# Patient Record
Sex: FEMALE | Race: WHITE | NOT HISPANIC OR LATINO | Employment: FULL TIME | URBAN - METROPOLITAN AREA
[De-identification: names, ages, dates, MRNs, and addresses within clinical notes are randomized per-mention and may not be internally consistent; named-entity substitution may affect disease eponyms.]

---

## 2017-02-15 ENCOUNTER — OFFICE VISIT (OUTPATIENT)
Dept: CARDIOLOGY | Facility: CLINIC | Age: 57
End: 2017-02-15

## 2017-02-15 VITALS
SYSTOLIC BLOOD PRESSURE: 116 MMHG | DIASTOLIC BLOOD PRESSURE: 66 MMHG | WEIGHT: 165 LBS | HEART RATE: 66 BPM | BODY MASS INDEX: 25.9 KG/M2 | HEIGHT: 67 IN

## 2017-02-15 DIAGNOSIS — Z82.49 FAMILY HISTORY OF PREMATURE CORONARY ARTERY DISEASE: Primary | ICD-10-CM

## 2017-02-15 DIAGNOSIS — E78.2 MIXED HYPERLIPIDEMIA: ICD-10-CM

## 2017-02-15 DIAGNOSIS — I87.2 CHRONIC VENOUS INSUFFICIENCY: ICD-10-CM

## 2017-02-15 PROCEDURE — 93000 ELECTROCARDIOGRAM COMPLETE: CPT | Performed by: INTERNAL MEDICINE

## 2017-02-15 PROCEDURE — 99213 OFFICE O/P EST LOW 20 MIN: CPT | Performed by: INTERNAL MEDICINE

## 2017-02-15 NOTE — PROGRESS NOTES
Date of Office Visit: 02/15/2017  Encounter Provider: Feliciano Cordova MD  Place of Service: [facility] CARDIOLOGY  Patient Name: Sarah Borges  :1960    Chief Complaint   Patient presents with   • Coronary Artery Disease   :     HPI: Sarah Borges is a 56 y.o. female who presents today to follow up.  She has a sister who has premature coronary artery disease and had a large anterior infarct. The patient has familial hyperlipidemia and I follow her for that. She had a CT calcium score in 2016 which was completely normal (score of zero).  She has not had any chest discomfort. She does have chronic venous insufficiency and has some lower extremity edema. She saw the vein care clinic who felt that it was not severe enough to require intervention unless she would prefer it for cosmetic reasons. She has decided to wait on that.  She is very active and is doing well.      Past Medical History   Diagnosis Date   • Agatston coronary artery calcium score less than 100      score was zero, 2016   • Cervical neuritis      C4-C5 of the left   • Chronic venous insufficiency    • Hyperlipidemia    • Lateral epicondylitis of right elbow    • Lumbar radiculopathy    • Neck pain        Past Surgical History   Procedure Laterality Date   • Hysterectomy     • Vein ligation and stripping         Social History     Social History   • Marital status:      Spouse name: N/A   • Number of children: N/A   • Years of education: N/A     Occupational History   • Not on file.     Social History Main Topics   • Smoking status: Former Smoker   • Smokeless tobacco: Not on file   • Alcohol use Yes      Comment: WEEKENDS    • Drug use: No   • Sexual activity: Defer     Other Topics Concern   • Not on file     Social History Narrative       Family History   Problem Relation Age of Onset   • Hypertension Mother    • Heart attack Mother      At an advanced age.   • Migraines Sister    • Heart attack Sister   "    At a young age.   • Cancer Maternal Grandmother      Colon       Review of Systems   All other systems reviewed and are negative.      No Known Allergies      Current Outpatient Prescriptions:   •  atorvastatin (LIPITOR) 20 MG tablet, Take 1 tablet by mouth Every Night., Disp: 30 tablet, Rfl: 6  •  ferrous sulfate 325 (65 FE) MG tablet, Take 1 tablet by mouth daily with breakfast., Disp: 60 tablet, Rfl: 0  •  gabapentin (NEURONTIN) 300 MG capsule, Take 300 mg by mouth 3 (Three) Times a Day., Disp: , Rfl:   •  ibuprofen (ADVIL,MOTRIN) 200 MG tablet, Take 200 mg by mouth Every 6 (Six) Hours As Needed for mild pain (1-3)., Disp: , Rfl:   •  phentermine 37.5 MG capsule, Take 37.5 mg by mouth Every Morning. PT REPORTS TAKING ONE HALF TABLET DAILY, Disp: , Rfl:   •  progesterone (PROMETRIUM) 100 MG capsule, Daily., Disp: , Rfl: 5  •  vitamin B-12 (CYANOCOBALAMIN) 1000 MCG tablet, Take 1,000 mcg by mouth Daily., Disp: , Rfl:      Objective:     Vitals:    02/15/17 1516   BP: 116/66   Pulse: 66   Weight: 165 lb (74.8 kg)   Height: 67\" (170.2 cm)     Body mass index is 25.84 kg/(m^2).    Physical Exam   Constitutional: She is oriented to person, place, and time. She appears well-developed and well-nourished.   HENT:   Head: Normocephalic.   Nose: Nose normal.   Mouth/Throat: Oropharynx is clear and moist.   Eyes: Conjunctivae and EOM are normal. Pupils are equal, round, and reactive to light.   Neck: Normal range of motion. No JVD present.   Cardiovascular: Normal rate, regular rhythm, normal heart sounds and intact distal pulses.    No murmur heard.  Pulmonary/Chest: Effort normal and breath sounds normal.   Abdominal: Soft. She exhibits no mass. There is no tenderness.   Musculoskeletal: Normal range of motion. She exhibits no edema.   Multiple varicosities, no edema   Lymphadenopathy:     She has no cervical adenopathy.   Neurological: She is alert and oriented to person, place, and time. No cranial nerve deficit. "   Skin: Skin is warm and dry. No rash noted.   Psychiatric: She has a normal mood and affect. Her behavior is normal. Judgment and thought content normal.   Vitals reviewed.        ECG 12 Lead  Date/Time: 2/15/2017 4:54 PM  Performed by: FELICIANO CORDOVA  Authorized by: FELICIANO CORDOVA   Comparison: compared with previous ECG   Similar to previous ECG  Rhythm: sinus rhythm  Conduction: conduction normal  ST Segments: ST segments normal  T Waves: T waves normal  Other: no other findings  Clinical impression: normal ECG              Assessment:       Diagnosis Plan   1. Family history of premature coronary artery disease     2. Mixed hyperlipidemia     3. Chronic venous insufficiency            Plan:       She has a strong family history of premature CAD and she has hyperlipidemia, but her CT calcium score in 2016 was zero.  This will need to be repeated in 5-10 years.  She will remain on atorvastatin; she doesn't tolerate higher doses.    She has prominent varicosities but no significant edema.      Sincerely,       Feliciano Cordova MD

## 2017-05-10 ENCOUNTER — APPOINTMENT (OUTPATIENT)
Dept: CARDIOLOGY | Facility: HOSPITAL | Age: 57
End: 2017-05-10

## 2017-05-10 ENCOUNTER — OFFICE VISIT (OUTPATIENT)
Dept: SPORTS MEDICINE | Facility: CLINIC | Age: 57
End: 2017-05-10

## 2017-05-10 VITALS
OXYGEN SATURATION: 98 % | WEIGHT: 159 LBS | BODY MASS INDEX: 24.96 KG/M2 | HEART RATE: 83 BPM | SYSTOLIC BLOOD PRESSURE: 122 MMHG | DIASTOLIC BLOOD PRESSURE: 84 MMHG | TEMPERATURE: 98.7 F | HEIGHT: 67 IN

## 2017-05-10 DIAGNOSIS — M79.605 PAIN OF LEFT LOWER EXTREMITY: Primary | ICD-10-CM

## 2017-05-10 PROCEDURE — 99214 OFFICE O/P EST MOD 30 MIN: CPT | Performed by: FAMILY MEDICINE

## 2017-05-11 ENCOUNTER — HOSPITAL ENCOUNTER (OUTPATIENT)
Dept: CARDIOLOGY | Facility: HOSPITAL | Age: 57
Discharge: HOME | End: 2017-05-11
Admitting: FAMILY MEDICINE

## 2017-05-11 DIAGNOSIS — M79.605 PAIN OF LEFT LOWER EXTREMITY: ICD-10-CM

## 2017-05-11 LAB
BH CV LOW VAS LEFT SAPHENOFEMORAL JUNCTION SPONT: 1
BH CV LOWER VASCULAR LEFT COMMON FEMORAL AUGMENT: NORMAL
BH CV LOWER VASCULAR LEFT COMMON FEMORAL COMPETENT: NORMAL
BH CV LOWER VASCULAR LEFT COMMON FEMORAL COMPRESS: NORMAL
BH CV LOWER VASCULAR LEFT COMMON FEMORAL PHASIC: NORMAL
BH CV LOWER VASCULAR LEFT COMMON FEMORAL SPONT: NORMAL
BH CV LOWER VASCULAR LEFT DISTAL FEMORAL COMPRESS: NORMAL
BH CV LOWER VASCULAR LEFT GASTRONEMIUS COMPRESS: NORMAL
BH CV LOWER VASCULAR LEFT GREATER SAPH AK COMPRESS: NORMAL
BH CV LOWER VASCULAR LEFT GREATER SAPH BK COMPRESS: NORMAL
BH CV LOWER VASCULAR LEFT MID FEMORAL AUGMENT: NORMAL
BH CV LOWER VASCULAR LEFT MID FEMORAL COMPETENT: NORMAL
BH CV LOWER VASCULAR LEFT MID FEMORAL COMPRESS: NORMAL
BH CV LOWER VASCULAR LEFT MID FEMORAL PHASIC: NORMAL
BH CV LOWER VASCULAR LEFT MID FEMORAL SPONT: NORMAL
BH CV LOWER VASCULAR LEFT PERONEAL COMPRESS: NORMAL
BH CV LOWER VASCULAR LEFT POPLITEAL AUGMENT: NORMAL
BH CV LOWER VASCULAR LEFT POPLITEAL COMPETENT: NORMAL
BH CV LOWER VASCULAR LEFT POPLITEAL COMPRESS: NORMAL
BH CV LOWER VASCULAR LEFT POPLITEAL PHASIC: NORMAL
BH CV LOWER VASCULAR LEFT POPLITEAL SPONT: NORMAL
BH CV LOWER VASCULAR LEFT POSTERIOR TIBIAL COMPRESS: NORMAL
BH CV LOWER VASCULAR LEFT PROXIMAL FEMORAL COMPRESS: NORMAL
BH CV LOWER VASCULAR LEFT SAPHENOFEMORAL JUNCTION AUGMENT: NORMAL
BH CV LOWER VASCULAR LEFT SAPHENOFEMORAL JUNCTION COMPETENT: NORMAL
BH CV LOWER VASCULAR LEFT SAPHENOFEMORAL JUNCTION COMPRESS: NORMAL
BH CV LOWER VASCULAR LEFT SAPHENOFEMORAL JUNCTION PHASIC: NORMAL
BH CV LOWER VASCULAR LEFT SAPHENOFEMORAL JUNCTION SPONT: NORMAL
BH CV LOWER VASCULAR RIGHT COMMON FEMORAL AUGMENT: NORMAL
BH CV LOWER VASCULAR RIGHT COMMON FEMORAL COMPETENT: NORMAL
BH CV LOWER VASCULAR RIGHT COMMON FEMORAL COMPRESS: NORMAL
BH CV LOWER VASCULAR RIGHT COMMON FEMORAL PHASIC: NORMAL
BH CV LOWER VASCULAR RIGHT COMMON FEMORAL SPONT: NORMAL

## 2017-05-11 PROCEDURE — 93971 EXTREMITY STUDY: CPT

## 2017-05-23 ENCOUNTER — OFFICE VISIT (OUTPATIENT)
Dept: OBSTETRICS AND GYNECOLOGY | Facility: CLINIC | Age: 57
End: 2017-05-23

## 2017-05-23 VITALS
BODY MASS INDEX: 25.11 KG/M2 | WEIGHT: 160 LBS | HEIGHT: 67 IN | DIASTOLIC BLOOD PRESSURE: 70 MMHG | SYSTOLIC BLOOD PRESSURE: 138 MMHG

## 2017-05-23 DIAGNOSIS — Z01.419 WELL WOMAN EXAM WITH ROUTINE GYNECOLOGICAL EXAM: Primary | ICD-10-CM

## 2017-05-23 PROCEDURE — 99396 PREV VISIT EST AGE 40-64: CPT | Performed by: OBSTETRICS & GYNECOLOGY

## 2017-07-27 RX ORDER — ATORVASTATIN CALCIUM 20 MG/1
TABLET, FILM COATED ORAL
Qty: 30 TABLET | Refills: 0 | Status: SHIPPED | OUTPATIENT
Start: 2017-07-27 | End: 2017-09-13 | Stop reason: SDUPTHER

## 2017-09-13 DIAGNOSIS — E78.2 MIXED HYPERLIPIDEMIA: Primary | ICD-10-CM

## 2017-09-13 RX ORDER — ATORVASTATIN CALCIUM 20 MG/1
TABLET, FILM COATED ORAL
Qty: 30 TABLET | Refills: 2 | Status: SHIPPED | OUTPATIENT
Start: 2017-09-13 | End: 2018-01-15 | Stop reason: SDUPTHER

## 2017-09-13 NOTE — TELEPHONE ENCOUNTER
Pt is due for chol labs, called to discuss with pt, but she was unavailable.  Will contact pt tomorrow to see if she has had any labs done outside [facility].

## 2017-09-15 NOTE — TELEPHONE ENCOUNTER
Discussed with pt she denies any recent chol labs.  Please see pending lab orders.  Pt's lov 02/15/17 to return in 2 yrs.  Pt's last lipids on 08/09/2016.

## 2017-10-12 ENCOUNTER — LAB (OUTPATIENT)
Dept: LAB | Facility: HOSPITAL | Age: 57
End: 2017-10-12

## 2017-10-12 DIAGNOSIS — E78.2 MIXED HYPERLIPIDEMIA: ICD-10-CM

## 2017-10-12 LAB
CHOLEST SERPL-MCNC: 229 MG/DL (ref 0–200)
HDLC SERPL-MCNC: 73 MG/DL (ref 40–60)
LDLC SERPL CALC-MCNC: 142 MG/DL (ref 0–100)
LDLC/HDLC SERPL: 1.95 {RATIO}
TRIGL SERPL-MCNC: 69 MG/DL (ref 0–150)
VLDLC SERPL-MCNC: 13.8 MG/DL (ref 5–40)

## 2017-10-12 PROCEDURE — 36415 COLL VENOUS BLD VENIPUNCTURE: CPT

## 2017-10-12 PROCEDURE — 80061 LIPID PANEL: CPT

## 2018-01-16 RX ORDER — ATORVASTATIN CALCIUM 20 MG/1
TABLET, FILM COATED ORAL
Qty: 30 TABLET | Refills: 4 | Status: SHIPPED | OUTPATIENT
Start: 2018-01-16 | End: 2018-11-04 | Stop reason: SDUPTHER

## 2018-03-01 ENCOUNTER — OFFICE VISIT (OUTPATIENT)
Dept: SPORTS MEDICINE | Facility: CLINIC | Age: 58
End: 2018-03-01

## 2018-03-01 VITALS
WEIGHT: 155 LBS | OXYGEN SATURATION: 98 % | SYSTOLIC BLOOD PRESSURE: 114 MMHG | BODY MASS INDEX: 24.33 KG/M2 | HEIGHT: 67 IN | HEART RATE: 75 BPM | TEMPERATURE: 98.3 F | DIASTOLIC BLOOD PRESSURE: 68 MMHG

## 2018-03-01 DIAGNOSIS — J10.1 INFLUENZA B: ICD-10-CM

## 2018-03-01 DIAGNOSIS — R68.89 FLU-LIKE SYMPTOMS: Primary | ICD-10-CM

## 2018-03-01 PROCEDURE — 87804 INFLUENZA ASSAY W/OPTIC: CPT | Performed by: FAMILY MEDICINE

## 2018-03-01 PROCEDURE — 99213 OFFICE O/P EST LOW 20 MIN: CPT | Performed by: FAMILY MEDICINE

## 2018-03-01 RX ORDER — DEXTROMETHORPHAN HYDROBROMIDE AND PROMETHAZINE HYDROCHLORIDE 15; 6.25 MG/5ML; MG/5ML
SYRUP ORAL
Qty: 180 ML | Refills: 0 | Status: SHIPPED | OUTPATIENT
Start: 2018-03-01 | End: 2019-06-12

## 2018-03-01 RX ORDER — AMOXICILLIN AND CLAVULANATE POTASSIUM 250; 125 MG/1; MG/1
1 TABLET, FILM COATED ORAL 3 TIMES DAILY
COMMUNITY
End: 2018-03-01

## 2018-03-01 RX ORDER — OSELTAMIVIR PHOSPHATE 75 MG/1
75 CAPSULE ORAL 2 TIMES DAILY
Qty: 10 CAPSULE | Refills: 0 | Status: SHIPPED | OUTPATIENT
Start: 2018-03-01 | End: 2019-06-12

## 2018-03-01 NOTE — PROGRESS NOTES
"Sarah is a 57 y.o. year old female    Chief Complaint   Patient presents with   • Sinusitis     Pt would like to discuss        History of Present Illness   HPI   Sickness started Tuesday, worsening with fever 100.7 this morning.   +diarrhea  Malaise, myalgias  HA  Generalized, moderately severe    Saw [facility] yesterday, given augmentin for \"sinusitis\"    I have reviewed the patient's medical, family, and social history in detail and updated the computerized patient record.    Review of Systems   Constitutional: Positive for chills, fatigue and fever.   Respiratory: Negative for shortness of breath.    Neurological: Positive for headaches.       /68  Pulse 75  Temp 98.3 °F (36.8 °C)  Ht 170.2 cm (67\")  Wt 70.3 kg (155 lb)  SpO2 98%  BMI 24.28 kg/m2     Physical Exam   Constitutional: She appears well-developed and well-nourished.   HENT:   Mouth/Throat: Oropharynx is clear and moist.   Eyes: Pupils are equal, round, and reactive to light.   Cardiovascular: Normal heart sounds.    Pulmonary/Chest: Effort normal and breath sounds normal.   Lymphadenopathy:     She has cervical adenopathy.   Psychiatric: She has a normal mood and affect.   Vitals reviewed.    Flu B positive.     Diagnoses and all orders for this visit:    Flu-like symptoms  -     POC Influenza A / B    Influenza B  -     oseltamivir (TAMIFLU) 75 MG capsule; Take 1 capsule by mouth 2 (Two) Times a Day.  -     promethazine-dextromethorphan (PROMETHAZINE-DM) 6.25-15 MG/5ML syrup; Take 5-10mL po q6 hours prn cough    Other orders  -     Discontinue: amoxicillin-clavulanate (AUGMENTIN) 250-125 MG per tablet; Take 1 tablet by mouth 3 (Three) Times a Day.    Note she is right at 48 hour carlos, but only 24 with fever. Discussed borderline indication for tamiflu at this point. Discussed symptomatic measures and contagion.  D/c augmentin.        EMR Dragon/Transcription disclaimer:    Much of this encounter note is an electronic " transcription/translation of spoken language to printed text.  The electronic translation of spoken language may permit erroneous, or at times, nonsensical words or phrases to be inadvertently transcribed.  Although I have reviewed the note for such errors some may still exist.

## 2018-03-07 LAB
EXPIRATION DATE: ABNORMAL
FLUAV AG NPH QL: ABNORMAL
FLUBV AG NPH QL: ABNORMAL
INTERNAL CONTROL: ABNORMAL
Lab: ABNORMAL

## 2018-07-31 ENCOUNTER — TELEPHONE (OUTPATIENT)
Dept: SPORTS MEDICINE | Facility: CLINIC | Age: 58
End: 2018-07-31

## 2018-08-01 DIAGNOSIS — M54.16 LUMBAR RADICULITIS: Primary | ICD-10-CM

## 2018-11-05 RX ORDER — ATORVASTATIN CALCIUM 20 MG/1
TABLET, FILM COATED ORAL
Qty: 30 TABLET | Refills: 2 | Status: SHIPPED | OUTPATIENT
Start: 2018-11-05 | End: 2019-02-10 | Stop reason: SDUPTHER

## 2019-02-14 RX ORDER — ATORVASTATIN CALCIUM 20 MG/1
TABLET, FILM COATED ORAL
Qty: 30 TABLET | Refills: 0 | Status: SHIPPED | OUTPATIENT
Start: 2019-02-14 | End: 2019-03-18 | Stop reason: SDUPTHER

## 2019-03-18 DIAGNOSIS — E78.2 MIXED HYPERLIPIDEMIA: Primary | ICD-10-CM

## 2019-03-18 RX ORDER — ATORVASTATIN CALCIUM 20 MG/1
TABLET, FILM COATED ORAL
Qty: 30 TABLET | Refills: 1 | Status: SHIPPED | OUTPATIENT
Start: 2019-03-18 | End: 2019-06-12 | Stop reason: SDUPTHER

## 2019-03-18 NOTE — TELEPHONE ENCOUNTER
Refilled pt's Atorvastatin 20 mg for mixed hyperlipidemia.  Pt has an upcoming appt with Magali on 05/02/19.  Pt's last lipid was 10/12/2017.  Please see pending order.      Note: I checked for pt's pcp labs in [email or web address] as well.

## 2019-03-19 NOTE — TELEPHONE ENCOUNTER
Called and informed pt of recommended labs. She verbalized understanding and will have them completed.

## 2019-03-28 ENCOUNTER — TELEPHONE (OUTPATIENT)
Dept: CARDIOLOGY | Facility: CLINIC | Age: 59
End: 2019-03-28

## 2019-04-02 ENCOUNTER — LAB (OUTPATIENT)
Dept: LAB | Facility: HOSPITAL | Age: 59
End: 2019-04-02

## 2019-04-02 DIAGNOSIS — E78.2 MIXED HYPERLIPIDEMIA: ICD-10-CM

## 2019-04-02 LAB
CHOLEST SERPL-MCNC: 259 MG/DL (ref 0–200)
HDLC SERPL-MCNC: 67 MG/DL (ref 40–60)
LDLC SERPL CALC-MCNC: 172 MG/DL (ref 0–100)
LDLC/HDLC SERPL: 2.57 {RATIO}
TRIGL SERPL-MCNC: 98 MG/DL (ref 0–150)
VLDLC SERPL-MCNC: 19.6 MG/DL (ref 5–40)

## 2019-04-02 PROCEDURE — 80061 LIPID PANEL: CPT

## 2019-04-02 PROCEDURE — 36415 COLL VENOUS BLD VENIPUNCTURE: CPT

## 2019-04-11 ENCOUNTER — APPOINTMENT (OUTPATIENT)
Dept: WOMENS IMAGING | Facility: HOSPITAL | Age: 59
End: 2019-04-11

## 2019-04-11 ENCOUNTER — PROCEDURE VISIT (OUTPATIENT)
Dept: OBSTETRICS AND GYNECOLOGY | Facility: CLINIC | Age: 59
End: 2019-04-11

## 2019-04-11 ENCOUNTER — OFFICE VISIT (OUTPATIENT)
Dept: OBSTETRICS AND GYNECOLOGY | Facility: CLINIC | Age: 59
End: 2019-04-11

## 2019-04-11 VITALS
HEIGHT: 67 IN | BODY MASS INDEX: 26.46 KG/M2 | SYSTOLIC BLOOD PRESSURE: 124 MMHG | DIASTOLIC BLOOD PRESSURE: 68 MMHG | WEIGHT: 168.6 LBS

## 2019-04-11 DIAGNOSIS — M85.80 OSTEOPENIA, UNSPECIFIED LOCATION: ICD-10-CM

## 2019-04-11 DIAGNOSIS — Z12.31 VISIT FOR SCREENING MAMMOGRAM: Primary | ICD-10-CM

## 2019-04-11 DIAGNOSIS — Z12.39 SCREENING FOR BREAST CANCER: ICD-10-CM

## 2019-04-11 DIAGNOSIS — Z01.419 ENCOUNTER FOR GYNECOLOGICAL EXAMINATION WITHOUT ABNORMAL FINDING: Primary | ICD-10-CM

## 2019-04-11 DIAGNOSIS — Z78.0 MENOPAUSE: ICD-10-CM

## 2019-04-11 LAB
DEVELOPER EXPIRATION DATE: NORMAL
DEVELOPER LOT NUMBER: NORMAL
EXPIRATION DATE: NORMAL
FECAL OCCULT BLOOD SCREEN, POC: NEGATIVE
Lab: NORMAL
NEGATIVE CONTROL: NEGATIVE
POSITIVE CONTROL: POSITIVE

## 2019-04-11 PROCEDURE — 77067 SCR MAMMO BI INCL CAD: CPT | Performed by: RADIOLOGY

## 2019-04-11 PROCEDURE — G0328 FECAL BLOOD SCRN IMMUNOASSAY: HCPCS | Performed by: OBSTETRICS & GYNECOLOGY

## 2019-04-11 PROCEDURE — 99396 PREV VISIT EST AGE 40-64: CPT | Performed by: OBSTETRICS & GYNECOLOGY

## 2019-04-11 PROCEDURE — 77067 SCR MAMMO BI INCL CAD: CPT | Performed by: OBSTETRICS & GYNECOLOGY

## 2019-04-11 NOTE — PROGRESS NOTES
Subjective    Chief Complaint   Patient presents with   • Gynecologic Exam     ae, hyst w/o bso, no problems, former Dr. doe pt, would like to discuss hormones      History of Present Illness    Sarah Borges is a 58 y.o. female who presents for annual exam.  Non-smoker.  Mammogram today.  Does want a DEXA scan scheduled as has had osteopenia in the past.  Colonoscopy 5 years ago due to a family history of colon cancer will have one soon.  Not having many hot flashes.  Previous hysterectomy without BSO.  Does take progesterone 100 mg a day for weight loss by weight loss physician.    Obstetric History:  OB History     No data available         Menstrual History:     No LMP recorded.       Past Medical History:   Diagnosis Date   • Agatston coronary artery calcium score less than 100     score was zero, 2/2016   • Cervical neuritis     C4-C5 of the left   • Chronic venous insufficiency    • Hyperlipidemia    • Lateral epicondylitis of right elbow    • Lumbar radiculopathy    • Neck pain      Family History   Problem Relation Age of Onset   • Hypertension Mother    • Heart attack Mother         At an advanced age.   • Migraines Sister    • Heart attack Sister         At a young age.   • Cancer Maternal Grandmother         Colon     Social History     Tobacco Use   Smoking Status Former Smoker   Smokeless Tobacco Never Used     Counseling given: Not Answered      The following portions of the patient's history were reviewed and updated as appropriate: allergies, current medications, past family history, past medical history, past social history, past surgical history and problem list.    Review of Systems   Constitutional: Negative.  Negative for fever and unexpected weight change.   HENT: Negative.    Respiratory: Negative for shortness of breath and wheezing.    Cardiovascular: Negative for chest pain, palpitations and leg swelling.   Gastrointestinal: Negative for abdominal pain, anal bleeding and blood in  "stool.   Genitourinary: Negative for dysuria, pelvic pain, urgency, vaginal bleeding, vaginal discharge and vaginal pain.   Skin: Negative.    Neurological: Negative.    Hematological: Negative.  Negative for adenopathy.   Psychiatric/Behavioral: Negative.  Negative for dysphoric mood. The patient is not nervous/anxious.             Objective   Physical Exam   Constitutional: She is oriented to person, place, and time. She appears well-developed and well-nourished.   HENT:   Head: Normocephalic.   Eyes: Pupils are equal, round, and reactive to light.   Neck: No tracheal deviation present. No thyromegaly present.   Cardiovascular: Normal rate, regular rhythm and normal heart sounds.   No murmur heard.  Pulmonary/Chest: Effort normal and breath sounds normal. No respiratory distress.   Breasts without masses, tenderness or nipple discharge   Abdominal: Soft. She exhibits no mass. There is no hepatosplenomegaly. There is no tenderness. No hernia.   Genitourinary: Rectum normal and vagina normal. Rectal exam shows guaiac negative stool. No vaginal discharge found.   Genitourinary Comments: External genitalia normal   Lymphadenopathy:     She has no cervical adenopathy.     She has no axillary adenopathy.        Right: No inguinal adenopathy present.        Left: No inguinal adenopathy present.   Neurological: She is alert and oriented to person, place, and time.   Skin: Skin is warm and dry. No rash noted.   Psychiatric: She has a normal mood and affect. Her behavior is normal.   Vitals reviewed.      /68   Ht 170.2 cm (67\")   Wt 76.5 kg (168 lb 9.6 oz)   BMI 26.41 kg/m²     Assessment/Plan   Sarah was seen today for gynecologic exam.    Diagnoses and all orders for this visit:    Encounter for gynecological examination without abnormal finding  -     IGP,rfx Aptima HPV All Pth  -     POC Occult Blood Stool    Menopause    Screening for breast cancer    Osteopenia, unspecified location        Mammogram.  " Yusef  RTO 1 year     Counseled about continuing calcium with vitamin D.

## 2019-04-15 LAB
CONV .: NORMAL
CYTOLOGIST CVX/VAG CYTO: NORMAL
CYTOLOGY CVX/VAG DOC THIN PREP: NORMAL
DX ICD CODE: NORMAL
HIV 1 & 2 AB SER-IMP: NORMAL
OTHER STN SPEC: NORMAL
PATH REPORT.FINAL DX SPEC: NORMAL
STAT OF ADQ CVX/VAG CYTO-IMP: NORMAL

## 2019-05-23 ENCOUNTER — PROCEDURE VISIT (OUTPATIENT)
Dept: OBSTETRICS AND GYNECOLOGY | Facility: CLINIC | Age: 59
End: 2019-05-23

## 2019-05-23 DIAGNOSIS — Z78.0 MENOPAUSE: ICD-10-CM

## 2019-05-23 DIAGNOSIS — Z00.00 PREVENTATIVE HEALTH CARE: Primary | ICD-10-CM

## 2019-05-23 PROCEDURE — 77080 DXA BONE DENSITY AXIAL: CPT | Performed by: OBSTETRICS & GYNECOLOGY

## 2019-05-30 ENCOUNTER — TELEPHONE (OUTPATIENT)
Dept: OBSTETRICS AND GYNECOLOGY | Facility: CLINIC | Age: 59
End: 2019-05-30

## 2019-06-12 ENCOUNTER — OFFICE VISIT (OUTPATIENT)
Dept: CARDIOLOGY | Facility: CLINIC | Age: 59
End: 2019-06-12

## 2019-06-12 VITALS
WEIGHT: 169.6 LBS | DIASTOLIC BLOOD PRESSURE: 64 MMHG | BODY MASS INDEX: 26.62 KG/M2 | HEIGHT: 67 IN | SYSTOLIC BLOOD PRESSURE: 120 MMHG | HEART RATE: 70 BPM

## 2019-06-12 DIAGNOSIS — R60.0 LOWER EXTREMITY EDEMA: ICD-10-CM

## 2019-06-12 DIAGNOSIS — Z82.49 FAMILY HISTORY OF PREMATURE CORONARY ARTERY DISEASE: ICD-10-CM

## 2019-06-12 DIAGNOSIS — I87.2 CHRONIC VENOUS INSUFFICIENCY: ICD-10-CM

## 2019-06-12 DIAGNOSIS — E78.2 MIXED HYPERLIPIDEMIA: Primary | ICD-10-CM

## 2019-06-12 PROCEDURE — 99214 OFFICE O/P EST MOD 30 MIN: CPT | Performed by: NURSE PRACTITIONER

## 2019-06-12 PROCEDURE — 93000 ELECTROCARDIOGRAM COMPLETE: CPT | Performed by: NURSE PRACTITIONER

## 2019-06-12 RX ORDER — ATORVASTATIN CALCIUM 40 MG/1
40 TABLET, FILM COATED ORAL DAILY
Qty: 90 TABLET | Refills: 1 | Status: SHIPPED | OUTPATIENT
Start: 2019-06-12 | End: 2019-12-05 | Stop reason: SDUPTHER

## 2019-06-12 NOTE — PROGRESS NOTES
Date of Office Visit: 2019  Encounter Provider: ANA Walter  Place of Service: [facility] CARDIOLOGY  Patient Name: Sarah Borges  :1960      Chief Complaint   Patient presents with   • Family history of premature CAD   • Follow-up   :     Dear Dr. Araujo,     HPI: Sarah Borges is a pleasant 58 y.o. female who presents today for cardiac follow up. She is a new patient to me and her previous records have been reviewed.     She has a sister who had premature coronary artery disease and a large anterior infarct.  The patient's been diagnosed with familial hyperlipidemia and she has remained on statin therapy.  In 2016, she had a CT calcium score which was completely normal with a score of 0.  She has a history of chronic venous insufficiency and lower extremity edema.  She informs me that she has degenerative disc disease and underwent a neck fusion in 2019 and has been recommended to undergo a back fusion in 3 weeks.    She presents today for follow-up.  She was last seen in the office by Feliciano Cordova in 2017 and recommended to follow-up in 2 years.  She is not able to exercise due to her chronic back and neck pain.  She denies chest pain, shortness of air, PND, orthopnea, cough, palpitations, dizziness, or syncope.  She continues to have the chronic lower extremity edema  And feels that the varicosities in her lower extremities have worsened.  She does report some occasional raspy voice and shortness of breath with talking, but never exertion.  She does admit that she has gastric reflux.  Her blood pressure and heart rate are both normal today.  She has not had any recent blood work completed.    Past Medical History:   Diagnosis Date   • Agatston coronary artery calcium score less than 100     score was zero, 2016   • Cervical neuritis     C4-C5 of the left   • Chronic venous insufficiency    • Hyperlipidemia    • Lateral  epicondylitis of right elbow    • Lumbar radiculopathy    • Neck pain        Past Surgical History:   Procedure Laterality Date   • HYSTERECTOMY     • NECK SURGERY     • VEIN LIGATION AND STRIPPING         Social History     Socioeconomic History   • Marital status:      Spouse name: Not on file   • Number of children: Not on file   • Years of education: Not on file   • Highest education level: Not on file   Tobacco Use   • Smoking status: Former Smoker   • Smokeless tobacco: Never Used   Substance and Sexual Activity   • Alcohol use: Yes     Comment: Caffeine use   • Drug use: No   • Sexual activity: Defer       Family History   Problem Relation Age of Onset   • Hypertension Mother    • Heart attack Mother         At an advanced age.   • Migraines Sister    • Heart attack Sister         At a young age.   • Cancer Maternal Grandmother         Colon       The following portion of the patient's history were reviewed and updated as appropriate: past medical history, past surgical history, past social history, past family history, allergies, current medications, and problem list.    Review of Systems   Constitution: Negative for chills, diaphoresis, fever, malaise/fatigue, night sweats, weight gain and weight loss.   HENT: Negative for hearing loss, nosebleeds, sore throat and tinnitus.    Eyes: Negative for blurred vision, double vision, pain and visual disturbance.   Cardiovascular: Negative for chest pain, claudication, cyanosis, dyspnea on exertion, irregular heartbeat, leg swelling, near-syncope, orthopnea, palpitations, paroxysmal nocturnal dyspnea and syncope.   Respiratory: Negative for cough, hemoptysis, shortness of breath, snoring and wheezing.    Endocrine: Negative for cold intolerance, heat intolerance and polyuria.   Hematologic/Lymphatic: Negative for bleeding problem. Does not bruise/bleed easily.   Skin: Negative for color change, dry skin, flushing and itching.   Musculoskeletal: Negative for  "falls, joint pain, joint swelling, muscle cramps, muscle weakness and myalgias.   Gastrointestinal: Negative for abdominal pain, constipation, heartburn, melena, nausea and vomiting.   Genitourinary: Negative for dysuria and hematuria.   Neurological: Negative for excessive daytime sleepiness, dizziness, light-headedness, loss of balance, numbness, paresthesias, seizures and vertigo.   Psychiatric/Behavioral: Negative for altered mental status, depression, memory loss and substance abuse. The patient does not have insomnia and is not nervous/anxious.    Allergic/Immunologic: Negative for environmental allergies.       No Known Allergies      Current Outpatient Medications:   •  atorvastatin (LIPITOR) 40 MG tablet, Take 1 tablet by mouth Daily., Disp: 90 tablet, Rfl: 1  •  ferrous sulfate 325 (65 FE) MG tablet, Take 1 tablet by mouth daily with breakfast., Disp: 60 tablet, Rfl: 0  •  gabapentin (NEURONTIN) 300 MG capsule, Take 300 mg by mouth 3 (Three) Times a Day., Disp: , Rfl:   •  ibuprofen (ADVIL,MOTRIN) 200 MG tablet, Take 200 mg by mouth Every 6 (Six) Hours As Needed for mild pain (1-3)., Disp: , Rfl:   •  progesterone (PROMETRIUM) 200 MG capsule, TK ONE C PO QHS, Disp: , Rfl: 5  •  vitamin B-12 (CYANOCOBALAMIN) 1000 MCG tablet, Take 1,000 mcg by mouth Daily., Disp: , Rfl:         Objective:     Vitals:    06/12/19 1447   BP: 120/64   BP Location: Left arm   Pulse: 70   Weight: 76.9 kg (169 lb 9.6 oz)   Height: 170.2 cm (67\")     Body mass index is 26.56 kg/m².    PHYSICAL EXAM:    Vitals Reviewed.   General Appearance: No acute distress, well developed and well nourished.   Eyes: Conjunctiva and lids: No erythema, swelling, or discharge. Sclera non-icteric.   HENT: Atraumatic, normocephalic. External eyes, ears, and nose normal. No hearing loss noted. Mucous membranes normal. Lips not cyanotic. Neck supple with no tenderness.  Respiratory: No signs of respiratory distress. Respiration rhythm and depth normal. "   Clear to auscultation. No rales, crackles, rhonchi, or wheezing auscultated.   Cardiovascular:  Jugular Venous Pressure: Normal  Heart Rate and Rhythm: Normal, Heart Sounds: Normal S1 and S2. No S3 or S4 noted.  Murmurs: No murmurs noted. No rubs, thrills, or gallops.   Arterial Pulses: Carotid pulses normal. No carotid bruit noted.   Lower Extremities: No edema noted.  Gastrointestinal:  Abdomen soft, non-distended, non-tender. Normal bowel sounds. No hepatomegaly.   Musculoskeletal: Normal movement of extremities  Skin and Nails: General appearance normal. No pallor, cyanosis, diaphoresis. Skin temperature normal. No clubbing of fingernails.   Psychiatric: Patient alert and oriented to person, place, and time. Speech and behavior appropriate. Normal mood and affect.       ECG 12 Lead  Date/Time: 6/12/2019 2:50 PM  Performed by: Magali Mendez APRN  Authorized by: Magali Mendez APRN   Comparison: compared with previous ECG from 2/15/2017  Similar to previous ECG  Rhythm: sinus rhythm  Rate: normal  BPM: 70  Conduction: conduction normal  ST Segments: ST segments normal  T Waves: T waves normal  QRS axis: normal  Other: no other findings    Clinical impression: normal ECG              Assessment:       Diagnosis Plan   1. Mixed hyperlipidemia  Comprehensive Metabolic Panel    CBC Auto Differential    Lipid Panel   2. Family history of premature coronary artery disease     3. Chronic venous insufficiency     4. Lower extremity edema            Plan:       1.  Familial Hyperlipidemia: She remains on atorvastatin, and cannot tolerate higher doses.  I recommended a repeat lipid panel and have also added a CMP and CBC to be completed.    2.  Family History of Premature CAD: We discussed risk factor modification.  She had a CT calcium score of 0 in 2016 and Dr. Cordova has recommended that be repeated in 5-10 years.    3/4.  Chronic Venous insufficiency and Lower Extremity Edema: She has bilateral trace lower  extremity edema and varicosities noted.  I recommended elevation of her legs, compression stockings, and low-sodium diet.    4.  I will follow-up with her about her lab results.  I recommend to follow-up with Feliciano Cordova in 2 years.      As always, it has been a pleasure to participate in your patient's care. Thank you.       Sincerely,         ANA Pérez        **Dragon Disclaimer:**  Much of this encounter note is an electronic transcription/translation of spoken language to printed text. The electronic translation of spoken language may permit erroneous, or at times, nonsensical words or phrases to be inadvertently transcribed. Although I have reviewed the note for such errors, some may still exist.

## 2019-06-18 ENCOUNTER — TELEPHONE (OUTPATIENT)
Dept: CARDIOLOGY | Facility: CLINIC | Age: 59
End: 2019-06-18

## 2019-06-18 NOTE — TELEPHONE ENCOUNTER
Lipid Panel   Order: [id number]   Status:  Final result   Visible to patient:  Yes (Efrain) Dx:  Mixed hyperlipidemia    Ref Range & Units 2mo ago 1yr ago   Total Cholesterol 0 - 200 mg/dL 259 Abnormally high   229 Abnormally high     Triglycerides 0 - 150 mg/dL 98  69    HDL Cholesterol 40 - 60 mg/dL 67 Abnormally high   73 Abnormally high     LDL Cholesterol  0 - 100 mg/dL 172 Abnormally high   142 Abnormally high     VLDL Cholesterol 5 - 40 mg/dL 19.6  13.8    LDL/HDL Ratio  2.57  1.95            I spoke with her via telephone.  We went over these results last week in the office.  She is going to go to have a repeat lipid panel done in the near future.

## 2019-06-18 NOTE — TELEPHONE ENCOUNTER
----- Message from Feliciano Cordova MD sent at 4/2/2019  9:46 AM EDT -----  Can be discussed at visit with TK next month.    Just postpone this until she returns.    olivia norris

## 2019-09-05 ENCOUNTER — TELEPHONE (OUTPATIENT)
Dept: CARDIOLOGY | Facility: CLINIC | Age: 59
End: 2019-09-05

## 2019-09-05 NOTE — TELEPHONE ENCOUNTER
Pt called stating that she had spinal surgery on 8/14/19.  For surgery they couldn't find a vein so they placed a port in the right side of her neck. They think they pokes the heart with the end of the catheter which put her in Atrial fibrillation. She was in atrial fibrillation for 17 minutes with a heart rate of 140. They pulled back on the catheter and she went back into sinus rhythm. She states that since the procedure she has had 3-4 more episodes.    I have called [facility] and requested records    I have placed records in your in box for review

## 2019-09-05 NOTE — TELEPHONE ENCOUNTER
I s/w her and reviewed the records.  She had brief AF due to guidewire manipulation turing catheter placement.      She has had one-second episodes of taking a breath but no palpitations or sustained symptoms.  I don't think that is PAF.    Will go ahead and have her see TK in one month; please arrange.    olivia norris

## 2019-09-26 ENCOUNTER — HOSPITAL ENCOUNTER (OUTPATIENT)
Dept: GENERAL RADIOLOGY | Facility: HOSPITAL | Age: 59
Discharge: HOME | End: 2019-09-26
Admitting: NEUROLOGICAL SURGERY

## 2019-09-26 DIAGNOSIS — M41.9 SCOLIOSIS, UNSPECIFIED SCOLIOSIS TYPE, UNSPECIFIED SPINAL REGION: ICD-10-CM

## 2019-09-26 PROCEDURE — 72082 X-RAY EXAM ENTIRE SPI 2/3 VW: CPT

## 2019-09-30 ENCOUNTER — TRANSCRIBE ORDERS (OUTPATIENT)
Dept: PHYSICAL THERAPY | Facility: HOSPITAL | Age: 59
End: 2019-09-30

## 2019-09-30 DIAGNOSIS — M54.9 BACK PAIN, UNSPECIFIED BACK LOCATION, UNSPECIFIED BACK PAIN LATERALITY, UNSPECIFIED CHRONICITY: Primary | ICD-10-CM

## 2019-10-01 ENCOUNTER — HOSPITAL ENCOUNTER (OUTPATIENT)
Dept: PHYSICAL THERAPY | Facility: HOSPITAL | Age: 59
Setting detail: THERAPIES SERIES
Discharge: HOME | End: 2019-10-01

## 2019-10-01 ENCOUNTER — LAB (OUTPATIENT)
Dept: LAB | Facility: HOSPITAL | Age: 59
End: 2019-10-01

## 2019-10-01 DIAGNOSIS — G89.29 CHRONIC RIGHT-SIDED THORACIC BACK PAIN: ICD-10-CM

## 2019-10-01 DIAGNOSIS — Z51.89 AFTERCARE: ICD-10-CM

## 2019-10-01 DIAGNOSIS — M54.6 CHRONIC RIGHT-SIDED THORACIC BACK PAIN: ICD-10-CM

## 2019-10-01 DIAGNOSIS — E78.2 MIXED HYPERLIPIDEMIA: ICD-10-CM

## 2019-10-01 DIAGNOSIS — M54.50 CHRONIC RIGHT-SIDED LOW BACK PAIN WITHOUT SCIATICA: Primary | ICD-10-CM

## 2019-10-01 DIAGNOSIS — G89.29 CHRONIC RIGHT-SIDED LOW BACK PAIN WITHOUT SCIATICA: Primary | ICD-10-CM

## 2019-10-01 DIAGNOSIS — R26.89 DECREASED MOBILITY: ICD-10-CM

## 2019-10-01 LAB
ALBUMIN SERPL-MCNC: 4.6 G/DL (ref 3.5–5.2)
ALBUMIN/GLOB SERPL: 1.7 G/DL
ALP SERPL-CCNC: 136 U/L (ref 39–117)
ALT SERPL W P-5'-P-CCNC: 10 U/L (ref 1–33)
ANION GAP SERPL CALCULATED.3IONS-SCNC: 9.5 MMOL/L (ref 5–15)
AST SERPL-CCNC: 13 U/L (ref 1–32)
BASOPHILS # BLD AUTO: 0.03 10*3/MM3 (ref 0–0.2)
BASOPHILS NFR BLD AUTO: 0.4 % (ref 0–1.5)
BILIRUB SERPL-MCNC: 0.5 MG/DL (ref 0.2–1.2)
BUN BLD-MCNC: 15 MG/DL (ref 6–20)
BUN/CREAT SERPL: 25.4 (ref 7–25)
CALCIUM SPEC-SCNC: 9.8 MG/DL (ref 8.6–10.5)
CHLORIDE SERPL-SCNC: 100 MMOL/L (ref 98–107)
CHOLEST SERPL-MCNC: 246 MG/DL (ref 0–200)
CO2 SERPL-SCNC: 33.5 MMOL/L (ref 22–29)
CREAT BLD-MCNC: 0.59 MG/DL (ref 0.57–1)
DEPRECATED RDW RBC AUTO: 40 FL (ref 37–54)
EOSINOPHIL # BLD AUTO: 0.09 10*3/MM3 (ref 0–0.4)
EOSINOPHIL NFR BLD AUTO: 1.2 % (ref 0.3–6.2)
ERYTHROCYTE [DISTWIDTH] IN BLOOD BY AUTOMATED COUNT: 12.7 % (ref 12.3–15.4)
GFR SERPL CREATININE-BSD FRML MDRD: 104 ML/MIN/1.73
GLOBULIN UR ELPH-MCNC: 2.7 GM/DL
GLUCOSE BLD-MCNC: 98 MG/DL (ref 65–99)
HCT VFR BLD AUTO: 38 % (ref 34–46.6)
HDLC SERPL-MCNC: 58 MG/DL (ref 40–60)
HGB BLD-MCNC: 12 G/DL (ref 12–15.9)
IMM GRANULOCYTES # BLD AUTO: 0.03 10*3/MM3 (ref 0–0.05)
IMM GRANULOCYTES NFR BLD AUTO: 0.4 % (ref 0–0.5)
LDLC SERPL CALC-MCNC: 170 MG/DL (ref 0–100)
LDLC/HDLC SERPL: 2.94 {RATIO}
LYMPHOCYTES # BLD AUTO: 1.06 10*3/MM3 (ref 0.7–3.1)
LYMPHOCYTES NFR BLD AUTO: 14.7 % (ref 19.6–45.3)
MCH RBC QN AUTO: 27.3 PG (ref 26.6–33)
MCHC RBC AUTO-ENTMCNC: 31.6 G/DL (ref 31.5–35.7)
MCV RBC AUTO: 86.4 FL (ref 79–97)
MONOCYTES # BLD AUTO: 0.5 10*3/MM3 (ref 0.1–0.9)
MONOCYTES NFR BLD AUTO: 6.9 % (ref 5–12)
NEUTROPHILS # BLD AUTO: 5.52 10*3/MM3 (ref 1.7–7)
NEUTROPHILS NFR BLD AUTO: 76.4 % (ref 42.7–76)
NRBC BLD AUTO-RTO: 0 /100 WBC (ref 0–0.2)
PLATELET # BLD AUTO: 362 10*3/MM3 (ref 140–450)
PMV BLD AUTO: 9.5 FL (ref 6–12)
POTASSIUM BLD-SCNC: 4.4 MMOL/L (ref 3.5–5.2)
PROT SERPL-MCNC: 7.3 G/DL (ref 6–8.5)
RBC # BLD AUTO: 4.4 10*6/MM3 (ref 3.77–5.28)
SODIUM BLD-SCNC: 143 MMOL/L (ref 136–145)
TRIGL SERPL-MCNC: 88 MG/DL (ref 0–150)
VLDLC SERPL-MCNC: 17.6 MG/DL (ref 5–40)
WBC NRBC COR # BLD: 7.23 10*3/MM3 (ref 3.4–10.8)

## 2019-10-01 PROCEDURE — 36415 COLL VENOUS BLD VENIPUNCTURE: CPT

## 2019-10-01 PROCEDURE — 80061 LIPID PANEL: CPT

## 2019-10-01 PROCEDURE — 97110 THERAPEUTIC EXERCISES: CPT | Performed by: PHYSICAL THERAPIST

## 2019-10-01 PROCEDURE — 85025 COMPLETE CBC W/AUTO DIFF WBC: CPT

## 2019-10-01 PROCEDURE — 97161 PT EVAL LOW COMPLEX 20 MIN: CPT | Performed by: PHYSICAL THERAPIST

## 2019-10-01 PROCEDURE — 80053 COMPREHEN METABOLIC PANEL: CPT
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
## 2019-10-04 ENCOUNTER — HOSPITAL ENCOUNTER (OUTPATIENT)
Dept: PHYSICAL THERAPY | Facility: HOSPITAL | Age: 59
Setting detail: THERAPIES SERIES
Discharge: HOME | End: 2019-10-04

## 2019-10-04 DIAGNOSIS — M54.6 CHRONIC RIGHT-SIDED THORACIC BACK PAIN: ICD-10-CM

## 2019-10-04 DIAGNOSIS — Z51.89 AFTERCARE: ICD-10-CM

## 2019-10-04 DIAGNOSIS — G89.29 CHRONIC RIGHT-SIDED THORACIC BACK PAIN: ICD-10-CM

## 2019-10-04 DIAGNOSIS — R26.89 DECREASED MOBILITY: ICD-10-CM

## 2019-10-04 DIAGNOSIS — G89.29 CHRONIC RIGHT-SIDED LOW BACK PAIN WITHOUT SCIATICA: Primary | ICD-10-CM

## 2019-10-04 DIAGNOSIS — M54.50 CHRONIC RIGHT-SIDED LOW BACK PAIN WITHOUT SCIATICA: Primary | ICD-10-CM

## 2019-10-04 PROCEDURE — 97110 THERAPEUTIC EXERCISES: CPT

## 2019-10-04 NOTE — THERAPY TREATMENT NOTE
Outpatient Physical Therapy Ortho Treatment Note  [facility]     Patient Name: Sarah Borges  : 1960  MRN: [id number]  Today's Date: 10/4/2019      Visit Date: 10/04/2019    Visit Dx:    ICD-10-CM ICD-9-CM   1. Chronic right-sided low back pain without sciatica M54.5 724.2    G89.29 338.29   2. Chronic right-sided thoracic back pain M54.6 724.1    G89.29 338.29   3. Aftercare Z51.89 V58.9   4. Decreased mobility R26.89 781.99       Patient Active Problem List   Diagnosis   • Hyperlipidemia   • Chronic venous insufficiency   • Family history of premature coronary artery disease   • Lower extremity edema        Past Medical History:   Diagnosis Date   • Agatston coronary artery calcium score less than 100     score was zero, 2016   • Cervical neuritis     C4-C5 of the left   • Chronic venous insufficiency    • Hyperlipidemia    • Lateral epicondylitis of right elbow    • Lumbar radiculopathy    • Neck pain         Past Surgical History:   Procedure Laterality Date   • HYSTERECTOMY     • NECK SURGERY     • VEIN LIGATION AND STRIPPING                         PT Assessment/Plan     Row Name 10/04/19 1224          PT Assessment    Assessment Comments  Mrs. Borges returns with no new complaints and pain remaining at 3-4/10.  She performed all exericses with mininmal cuing, mostly needed for SPPT, but she was able to perform better after cuing.  No increase in pain with any exercises.  -LP     Please refer to paper survey for additional self-reported information  Yes  -LP     Rehab Potential  Excellent  -LP     Patient/caregiver participated in establishment of treatment plan and goals  Yes  -LP     Patient would benefit from skilled therapy intervention  Yes  -LP        PT Plan    PT Frequency  2x/week  -LP     Predicted Duration of Therapy Intervention (Therapy Eval)  4- 6 weeks  -LP     PT Plan Comments  re-assess her ability to perform SPPT correctly.  Progress as able.  -LP       User Key  (r) =  Recorded By, (t) = Taken By, (c) = Cosigned By    Initials Name Provider Type    LP Mindy Yuan, PT Physical Therapist            OP Exercises     Row Name 10/04/19 1200 10/04/19 0900          Subjective Pain    Pre-Treatment Pain Level  3  -LP  3  -LP        Total Minutes    86875 - PT Therapeutic Exercise Minutes  40  -LP  --        Exercise 1    Exercise Name 1  --  HS stretch with ankle floss  -LP     Cueing 1  --  Demo  -LP     Sets 1  --  1  -LP     Reps 1  --  10  -LP     Time 1  --  10 sec  -LP        Exercise 2    Exercise Name 2  --  SKTC each side  -LP     Sets 2  --  1  -LP     Reps 2  --  10  -LP     Time 2  --  10 sec  -LP        Exercise 4    Exercise Name 4  --  PPT  -LP     Cueing 4  --  Demo  -LP     Sets 4  --  1  -LP     Reps 4  --  10  -LP     Time 4  --  5 sec  -LP        Exercise 5    Exercise Name 5  --  LTR  -LP     Cueing 5  --  Demo  -LP     Reps 5  --  10  -LP     Time 5  --  2 sec  -LP        Exercise 6    Exercise Name 6  Standing lumbar extension  -LP  --     Cueing 6  Demo;Verbal  -LP  --     Sets 6  1  -LP  --     Reps 6  10  -LP  --     Additional Comments  standing at IIbars  -LP  --        Exercise 7    Exercise Name 7  HL hip add with ball, with PPT  -LP  --     Cueing 7  Verbal;Tactile;Demo  -LP  --     Sets 7  1  -LP  --     Reps 7  10  -LP  --        Exercise 8    Exercise Name 8  standing HR's  -LP  --     Sets 8  2  -LP  --     Reps 8  10  -LP  --        Exercise 9    Exercise Name 9  stand hip abd  -LP  --     Sets 9  1  -LP  --     Reps 9  10  -LP  --     Additional Comments  alternating  -LP  --        Exercise 10    Exercise Name 10  practiced pelvic tilt in stand  -LP  --     Sets 10  1  -LP  --     Reps 10  5  -LP  --     Additional Comments  within comfortable range  -LP  --        Exercise 11    Exercise Name 11  Nustep  -LP  --     Time 11  5 min  -LP  --     Additional Comments  L3 cuing for TA contraction in sitting  -LP  --       User Key  (r) = Recorded  By, (t) = Taken By, (c) = Cosigned By    Initials Name Provider Type    LP Mindy Yuan, PT Physical Therapist                       PT OP Goals     Row Name 10/04/19 1200          PT Short Term Goals    STG 1  Patient will be independent with initial HEP.  -LP     STG 1 Progress  Ongoing  -LP     STG 1 Progress Comments  required some cuing, especially with SPPT  -LP     STG 2  Patient will maintain proper body mechanics for transfers and light object lifting for spine preservation.  -LP     STG 2 Progress  Ongoing  -LP     STG 2 Progress Comments  supine- sit correctly with no cuing  -LP        Long Term Goals    LTG 1  Patient will be independent with progressive HEP for long term management of current condition.  -LP     LTG 1 Progress  Ongoing  -LP     LTG 2  25% improvement in lumbar AROM all planes to ease transfers.  -LP     LTG 2 Progress  Ongoing  -LP     LTG 3  Patient will score </=26% disability on the modified CARLENE to indicate improved perceived positional tolerance.  -LP     LTG 3 Progress  Ongoing  -LP     LTG 4  Patient will demonstrate sit<>stand without facial grimacing to ease community reintegration.  -LP     LTG 4 Progress  Ongoing  -LP     LTG 4 Progress Comments  difficulty with this today.  -LP       User Key  (r) = Recorded By, (t) = Taken By, (c) = Cosigned By    Initials Name Provider Type    LP Mindy Yuan PT Physical Therapist          Therapy Education  Education Details: core strengthening concepts  Given: HEP, Symptoms/condition management, Posture/body mechanics  Program: New, Reinforced  How Provided: Verbal, Demonstration, Written  Provided to: Patient  Level of Understanding: Teach back education performed              Time Calculation:   Start Time: 0915  Stop Time: 1000  Time Calculation (min): 45 min  Therapy Charges for Today     Code Description Service Date Service Provider Modifiers Qty    80455791262  PT THER PROC EA 15 MIN 10/4/2019 Mindy Yuan, PT GP  3                    Mindy Yuan, PT  10/4/2019

## 2019-10-08 ENCOUNTER — TELEPHONE (OUTPATIENT)
Dept: CARDIOLOGY | Facility: CLINIC | Age: 59
End: 2019-10-08

## 2019-10-08 ENCOUNTER — HOSPITAL ENCOUNTER (OUTPATIENT)
Dept: PHYSICAL THERAPY | Facility: HOSPITAL | Age: 59
Setting detail: THERAPIES SERIES
Discharge: HOME | End: 2019-10-08

## 2019-10-08 DIAGNOSIS — G89.29 CHRONIC RIGHT-SIDED LOW BACK PAIN WITHOUT SCIATICA: Primary | ICD-10-CM

## 2019-10-08 DIAGNOSIS — Z51.89 AFTERCARE: ICD-10-CM

## 2019-10-08 DIAGNOSIS — M54.6 CHRONIC RIGHT-SIDED THORACIC BACK PAIN: ICD-10-CM

## 2019-10-08 DIAGNOSIS — M54.50 CHRONIC RIGHT-SIDED LOW BACK PAIN WITHOUT SCIATICA: Primary | ICD-10-CM

## 2019-10-08 DIAGNOSIS — G89.29 CHRONIC RIGHT-SIDED THORACIC BACK PAIN: ICD-10-CM

## 2019-10-08 DIAGNOSIS — R26.89 DECREASED MOBILITY: ICD-10-CM

## 2019-10-08 PROCEDURE — 97110 THERAPEUTIC EXERCISES: CPT | Performed by: PHYSICAL THERAPIST

## 2019-10-08 NOTE — TELEPHONE ENCOUNTER
I spoke with Mrs. Borges via telephone about her lab results and elevated LDL and total cholesterol.  She has not been able to tolerate the higher doses of atorvastatin and has familial hyperlipidemia.  I think she really would benefit from Repatha or Praluent.  I gave her both of the names of the medications and she is going to do some research on her own.  She will be following up with me for scheduled appointment on Thursday and will further discuss at that time.

## 2019-10-08 NOTE — THERAPY TREATMENT NOTE
Outpatient Physical Therapy Ortho Treatment Note  [facility]     Patient Name: Sarah Borges  : 1960  MRN: [id number]  Today's Date: 10/8/2019      Visit Date: 10/08/2019    Visit Dx:    ICD-10-CM ICD-9-CM   1. Chronic right-sided low back pain without sciatica M54.5 724.2    G89.29 338.29   2. Chronic right-sided thoracic back pain M54.6 724.1    G89.29 338.29   3. Aftercare Z51.89 V58.9   4. Decreased mobility R26.89 781.99       Patient Active Problem List   Diagnosis   • Hyperlipidemia   • Chronic venous insufficiency   • Family history of premature coronary artery disease   • Lower extremity edema        Past Medical History:   Diagnosis Date   • Agatston coronary artery calcium score less than 100     score was zero, 2016   • Cervical neuritis     C4-C5 of the left   • Chronic venous insufficiency    • Hyperlipidemia    • Lateral epicondylitis of right elbow    • Lumbar radiculopathy    • Neck pain         Past Surgical History:   Procedure Laterality Date   • HYSTERECTOMY     • NECK SURGERY     • VEIN LIGATION AND STRIPPING                         PT Assessment/Plan     Row Name 10/08/19 1603          PT Assessment    Assessment Comments  Instructed to decreased intensity/frequency of HEP performance to calm down her pain; patient was receptive. Progressed to sustained low load long duration stretches in clinic.  -GR        PT Plan    PT Plan Comments  Continue at decreased intensity of HEP.  -GR       User Key  (r) = Recorded By, (t) = Taken By, (c) = Cosigned By    Initials Name Provider Type    GR Jean Paul Emery, PT Physical Therapist            OP Exercises     Row Name 10/08/19 1500             Subjective Comments    Subjective Comments  I've been pretty sore and it's not really getting much better. Walking 1 mile per day.  -GR         Subjective Pain    Able to rate subjective pain?  yes  -GR      Pre-Treatment Pain Level  4  -GR         Total Minutes    13569 - PT Therapeutic  Exercise Minutes  39  -GR         Exercise 1    Exercise Name 1  HS stretch with ankle floss  -GR      Cueing 1  Demo  -GR      Sets 1  1  -GR      Reps 1  3  -GR      Time 1  20 sec  -GR      Additional Comments  opposite  leg supported over swiss ball  -GR         Exercise 2    Exercise Name 2  SKTC each side  -GR      Sets 2  1  -GR      Reps 2  3  -GR      Time 2  20 seconds  -GR         Exercise 3    Exercise Name 3  DKTC with swiss ball  -GR      Cueing 3  Demo  -GR      Sets 3  1  -GR      Reps 3  15  -GR      Time 3  5 sec  -GR         Exercise 4    Exercise Name 4  PPT  -GR      Cueing 4  Demo  -GR      Sets 4  1  -GR      Reps 4  15  -GR      Time 4  5 sec  -GR         Exercise 5    Exercise Name 5  LTR  -GR      Cueing 5  Demo  -GR      Sets 5  2  -GR      Reps 5  10  -GR      Time 5  2 sec  -GR      Additional Comments  swiss ball  -GR         Exercise 6    Exercise Name 6  Standing lumbar extension  -GR      Cueing 6  Demo;Verbal  -GR      Sets 6  1  -GR      Reps 6  15  -GR      Additional Comments  hands on barre  -GR         Exercise 7    Exercise Name 7  HL hip add with ball, with PPT  -GR      Cueing 7  Verbal;Tactile;Demo  -GR      Sets 7  1  -GR      Reps 7  15  -GR      Time 7  5 sec  -GR      Additional Comments  +TA draw-in focus  -GR         Exercise 8    Exercise Name 8  standing HR's  -GR      Sets 8  2  -GR      Reps 8  10  -GR         Exercise 9    Exercise Name 9  HL clam with TA  -GR      Cueing 9  Demo  -GR      Sets 9  1  -GR      Reps 9  15  -GR      Additional Comments  RTB  -GR         Exercise 10    Exercise Name 10  standing hip abd  -GR      Cueing 10  Demo  -GR      Sets 10  1  -GR      Reps 10  10  -GR      Additional Comments  each side  -GR         Exercise 11    Exercise Name 11  Nustep  -GR      Time 11  5 min  -GR      Additional Comments  L4  -GR         Exercise 12    Exercise Name 12  Piriformis stretch  -GR      Cueing 12  Demo  -GR      Reps 12  3  -GR      Time 12   20 sec  -GR         Exercise 13    Exercise Name 13  Gastroc stretch step  -GR      Cueing 13  Demo  -GR      Sets 13  1  -GR      Reps 13  3  -GR      Time 13  20 sec  -GR        User Key  (r) = Recorded By, (t) = Taken By, (c) = Cosigned By    Initials Name Provider Type    Jean Paul Christensen, PT Physical Therapist                       PT OP Goals     Row Name 10/08/19 1600          PT Short Term Goals    STG 1  Patient will be independent with initial HEP.  -GR     STG 1 Progress  Ongoing  -GR     STG 2  Patient will maintain proper body mechanics for transfers and light object lifting for spine preservation.  -GR     STG 2 Progress  Ongoing  -GR        Long Term Goals    LTG 1  Patient will be independent with progressive HEP for long term management of current condition.  -GR     LTG 1 Progress  Ongoing  -GR     LTG 2  25% improvement in lumbar AROM all planes to ease transfers.  -GR     LTG 2 Progress  Ongoing  -GR     LTG 3  Patient will score </=26% disability on the modified CARLENE to indicate improved perceived positional tolerance.  -GR     LTG 3 Progress  Ongoing  -GR     LTG 4  Patient will demonstrate sit<>stand without facial grimacing to ease community reintegration.  -GR     LTG 4 Progress  Ongoing  -GR       User Key  (r) = Recorded By, (t) = Taken By, (c) = Cosigned By    Initials Name Provider Type    Jean Paul Christensen, PT Physical Therapist          Therapy Education  Education Details: HEP every other day              Time Calculation:   Start Time: 1450  Stop Time: 1530  Time Calculation (min): 40 min  Total Timed Code Minutes- PT: 39 minute(s)  Therapy Charges for Today     Code Description Service Date Service Provider Modifiers Qty    73283571799  PT THER PROC EA 15 MIN 10/8/2019 Jean Paul Emery, PT GP 3                    Jean Paul Emery PT  10/8/2019

## 2019-10-08 NOTE — TELEPHONE ENCOUNTER
Isabel-this patient may be a great candidate for Repatha or Praluent.  Are we able to start the preauthorization department to see what medication her insurance prefers?

## 2019-10-10 ENCOUNTER — HOSPITAL ENCOUNTER (OUTPATIENT)
Dept: PHYSICAL THERAPY | Facility: HOSPITAL | Age: 59
Setting detail: THERAPIES SERIES
Discharge: HOME | End: 2019-10-10

## 2019-10-10 DIAGNOSIS — M54.6 CHRONIC RIGHT-SIDED THORACIC BACK PAIN: ICD-10-CM

## 2019-10-10 DIAGNOSIS — Z51.89 AFTERCARE: ICD-10-CM

## 2019-10-10 DIAGNOSIS — R26.89 DECREASED MOBILITY: ICD-10-CM

## 2019-10-10 DIAGNOSIS — G89.29 CHRONIC RIGHT-SIDED LOW BACK PAIN WITHOUT SCIATICA: Primary | ICD-10-CM

## 2019-10-10 DIAGNOSIS — G89.29 CHRONIC RIGHT-SIDED THORACIC BACK PAIN: ICD-10-CM

## 2019-10-10 DIAGNOSIS — M54.50 CHRONIC RIGHT-SIDED LOW BACK PAIN WITHOUT SCIATICA: Primary | ICD-10-CM

## 2019-10-10 PROCEDURE — 97110 THERAPEUTIC EXERCISES: CPT | Performed by: PHYSICAL THERAPIST

## 2019-10-10 PROCEDURE — G0283 ELEC STIM OTHER THAN WOUND: HCPCS | Performed by: PHYSICAL THERAPIST

## 2019-10-10 NOTE — THERAPY TREATMENT NOTE
Outpatient Physical Therapy Ortho Treatment Note  [facility]     Patient Name: Sarah Borges  : 1960  MRN: [id number]  Today's Date: 10/10/2019      Visit Date: 10/10/2019    Visit Dx:    ICD-10-CM ICD-9-CM   1. Chronic right-sided low back pain without sciatica M54.5 724.2    G89.29 338.29   2. Chronic right-sided thoracic back pain M54.6 724.1    G89.29 338.29   3. Aftercare Z51.89 V58.9   4. Decreased mobility R26.89 781.99       Patient Active Problem List   Diagnosis   • Hyperlipidemia   • Chronic venous insufficiency   • Family history of premature coronary artery disease   • Lower extremity edema        Past Medical History:   Diagnosis Date   • Agatston coronary artery calcium score less than 100     score was zero, 2016   • Cervical neuritis     C4-C5 of the left   • Chronic venous insufficiency    • Hyperlipidemia    • Lateral epicondylitis of right elbow    • Lumbar radiculopathy    • Neck pain         Past Surgical History:   Procedure Laterality Date   • HYSTERECTOMY     • NECK SURGERY     • VEIN LIGATION AND STRIPPING                         PT Assessment/Plan     Row Name 10/10/19 1406          PT Assessment    Assessment Comments  Decreased intensity and held resistance training 2/2 flare-up of both spine and B hips. Trialed IFC which was returned with no immediate adverse response. Instructed to hold on therex over weekend and lay in 90/90 with ice as able.  -GR        PT Plan    PT Plan Comments  Return to therex as tolerated.  -GR       User Key  (r) = Recorded By, (t) = Taken By, (c) = Cosigned By    Initials Name Provider Type    GR Jean Paul Emery, PT Physical Therapist          Modalities     Row Name 10/10/19 1300             Ice    Ice Applied  Yes  -GR      Location  lumbar with IFC  -GR      Rx Minutes  15 mins  -GR         ELECTRICAL STIMULATION    Attended/Unattended  Unattended  -GR      Stimulation Type  IFC  -GR      Max mAmp  7  -GR      Location/Electrode  "Placement/Other  2 channels/4 electrodes/lumbar spine  -GR       PT E-Stim Unattended (Manual) Minutes  15  -GR        User Key  (r) = Recorded By, (t) = Taken By, (c) = Cosigned By    Initials Name Provider Type    GR Jean Paul Emery, PT Physical Therapist        OP Exercises     Row Name 10/10/19 1300             Subjective Comments    Subjective Comments  Back and hips are \"killing me.\" Had to roll every 40 minutes.  -GR         Subjective Pain    Able to rate subjective pain?  yes  -GR      Pre-Treatment Pain Level  5  -GR         Total Minutes    58547 - PT Therapeutic Exercise Minutes  20  -GR         Exercise 3    Exercise Name 3  DKTC with swiss ball  -GR      Cueing 3  Demo  -GR      Sets 3  1  -GR      Reps 3  15  -GR      Time 3  5 sec  -GR         Exercise 5    Exercise Name 5  LTR  -GR      Cueing 5  Demo  -GR      Sets 5  1  -GR      Reps 5  15  -GR      Time 5  2 sec  -GR      Additional Comments  swiss ball  -GR         Exercise 11    Exercise Name 11  Nustep  -GR      Time 11  5 min  -GR      Additional Comments  L4  -GR         Exercise 12    Exercise Name 12  Piriformis stretch  -GR      Cueing 12  Demo  -GR      Reps 12  3  -GR      Time 12  20 sec  -GR         Exercise 13    Exercise Name 13  Gastroc stretch step  -GR      Cueing 13  Demo  -GR      Sets 13  1  -GR      Reps 13  3  -GR      Time 13  20 sec  -GR      Additional Comments  knee supported- stretching soleous and avoiding hs strain  -GR        User Key  (r) = Recorded By, (t) = Taken By, (c) = Cosigned By    Initials Name Provider Type    Jean Paul Christensen, PT Physical Therapist                       PT OP Goals     Row Name 10/10/19 1400          PT Short Term Goals    STG 1  Patient will be independent with initial HEP.  -GR     STG 1 Progress  Ongoing  -GR     STG 1 Progress Comments  adjusting do to pain and still requiring cues  -GR     STG 2  Patient will maintain proper body mechanics for transfers and light object " lifting for spine preservation.  -GR     STG 2 Progress  Ongoing  -GR        Long Term Goals    LTG 1  Patient will be independent with progressive HEP for long term management of current condition.  -GR     LTG 1 Progress  Ongoing  -GR     LTG 2  25% improvement in lumbar AROM all planes to ease transfers.  -GR     LTG 2 Progress  Ongoing  -GR     LTG 3  Patient will score </=26% disability on the modified CARLENE to indicate improved perceived positional tolerance.  -GR     LTG 3 Progress  Ongoing  -GR     LTG 4  Patient will demonstrate sit<>stand without facial grimacing to ease community reintegration.  -GR     LTG 4 Progress  Ongoing  -GR       User Key  (r) = Recorded By, (t) = Taken By, (c) = Cosigned By    Initials Name Provider Type    GR Jean Paul Emery, PT Physical Therapist                         Time Calculation:   Start Time: 1330  Stop Time: 1420  Time Calculation (min): 50 min  Total Timed Code Minutes- PT: 20 minute(s)  Therapy Charges for Today     Code Description Service Date Service Provider Modifiers Qty    30456249260  PT THER PROC EA 15 MIN 10/10/2019 Jean Paul Emery, PT GP 1    [id number]  PT ELECTRICAL STIM UNATTENDED 10/10/2019 Jean Paul Emery, PT  1                    Jean Paul Emery PT  10/10/2019

## 2019-10-15 ENCOUNTER — HOSPITAL ENCOUNTER (OUTPATIENT)
Dept: PHYSICAL THERAPY | Facility: HOSPITAL | Age: 59
Setting detail: THERAPIES SERIES
Discharge: HOME | End: 2019-10-15

## 2019-10-15 DIAGNOSIS — Z51.89 AFTERCARE: ICD-10-CM

## 2019-10-15 DIAGNOSIS — G89.29 CHRONIC RIGHT-SIDED THORACIC BACK PAIN: ICD-10-CM

## 2019-10-15 DIAGNOSIS — M54.6 CHRONIC RIGHT-SIDED THORACIC BACK PAIN: ICD-10-CM

## 2019-10-15 DIAGNOSIS — G89.29 CHRONIC RIGHT-SIDED LOW BACK PAIN WITHOUT SCIATICA: Primary | ICD-10-CM

## 2019-10-15 DIAGNOSIS — M54.50 CHRONIC RIGHT-SIDED LOW BACK PAIN WITHOUT SCIATICA: Primary | ICD-10-CM

## 2019-10-15 DIAGNOSIS — R26.89 DECREASED MOBILITY: ICD-10-CM

## 2019-10-15 PROCEDURE — G0283 ELEC STIM OTHER THAN WOUND: HCPCS | Performed by: PHYSICAL THERAPIST

## 2019-10-15 PROCEDURE — 97110 THERAPEUTIC EXERCISES: CPT | Performed by: PHYSICAL THERAPIST

## 2019-10-15 NOTE — THERAPY TREATMENT NOTE
Outpatient Physical Therapy Ortho Treatment Note  [facility]     Patient Name: Sarah Borges  : 1960  MRN: [id number]  Today's Date: 10/15/2019      Visit Date: 10/15/2019    Visit Dx:    ICD-10-CM ICD-9-CM   1. Chronic right-sided low back pain without sciatica M54.5 724.2    G89.29 338.29   2. Chronic right-sided thoracic back pain M54.6 724.1    G89.29 338.29   3. Aftercare Z51.89 V58.9   4. Decreased mobility R26.89 781.99       Patient Active Problem List   Diagnosis   • Hyperlipidemia   • Chronic venous insufficiency   • Family history of premature coronary artery disease   • Lower extremity edema        Past Medical History:   Diagnosis Date   • Agatston coronary artery calcium score less than 100     score was zero, 2016   • Cervical neuritis     C4-C5 of the left   • Chronic venous insufficiency    • Hyperlipidemia    • Lateral epicondylitis of right elbow    • Lumbar radiculopathy    • Neck pain         Past Surgical History:   Procedure Laterality Date   • HYSTERECTOMY     • NECK SURGERY     • VEIN LIGATION AND STRIPPING                         PT Assessment/Plan     Row Name 10/15/19 1523          PT Assessment    Assessment Comments  Able to return to several therex today without pain. Modified stretching positions as appropriate and continued IFC for pain control. Encouraged every other day HEP compliance and to not overwork the back while has the opportunity to be off work.  -GR        PT Plan    PT Plan Comments  See assessment. Consider return to resistance next visit.  -GR       User Key  (r) = Recorded By, (t) = Taken By, (c) = Cosigned By    Initials Name Provider Type    GR Jean Paul Emery, PT Physical Therapist          Modalities     Row Name 10/15/19 1300             Ice    Location  lumbar with IFC  -GR      Rx Minutes  15 mins  -GR         ELECTRICAL STIMULATION    Attended/Unattended  Unattended  -GR      Stimulation Type  IFC  -GR      Max mAmp  7  -GR       Location/Electrode Placement/Other  2 channels/4 electrodes/lumbar spine  -GR       PT E-Stim Unattended (Manual) Minutes  15  -GR        User Key  (r) = Recorded By, (t) = Taken By, (c) = Cosigned By    Initials Name Provider Type    GR Jean Paul Emery, PT Physical Therapist        OP Exercises     Row Name 10/15/19 1300             Subjective Comments    Subjective Comments  Hips much better today, back still remains sore, thinks it may always be in some sort of pain.   -GR         Subjective Pain    Able to rate subjective pain?  yes  -GR      Pre-Treatment Pain Level  3  -GR         Total Minutes    55321 - PT Therapeutic Exercise Minutes  32  -GR         Exercise 1    Exercise Name 1  HS stretch stair  -GR      Cueing 1  Demo  -GR      Sets 1  1  -GR      Reps 1  3  -GR      Time 1  20 sec  -GR         Exercise 3    Exercise Name 3  DKTC with swiss ball  -GR      Cueing 3  Demo  -GR      Sets 3  1  -GR      Reps 3  15  -GR      Time 3  5 sec  -GR         Exercise 4    Exercise Name 4  PPT  -GR      Cueing 4  Demo  -GR      Sets 4  1  -GR      Reps 4  15  -GR      Time 4  5 sec  -GR         Exercise 5    Exercise Name 5  LTR  -GR      Cueing 5  Demo  -GR      Sets 5  1  -GR      Reps 5  15  -GR      Time 5  2 sec  -GR      Additional Comments  swiss ball  -GR         Exercise 7    Exercise Name 7  HL hip add with ball, with PPT  -GR      Cueing 7  Verbal;Tactile;Demo  -GR      Sets 7  1  -GR      Reps 7  15  -GR      Time 7  5 sec  -GR         Exercise 11    Exercise Name 11  Nustep  -GR      Time 11  5 min  -GR      Additional Comments  L5  -GR         Exercise 12    Exercise Name 12  Piriformis stretch  -GR      Cueing 12  Demo  -GR      Reps 12  3  -GR      Time 12  20 sec  -GR      Additional Comments  modified to sitting  -GR         Exercise 13    Exercise Name 13  Gastroc stretch step  -GR      Cueing 13  Demo  -GR      Sets 13  1  -GR      Reps 13  3  -GR      Time 13  20 sec  -GR      Additional  Comments  off step  -GR        User Key  (r) = Recorded By, (t) = Taken By, (c) = Cosigned By    Initials Name Provider Type    Jean Paul Christensen, PT Physical Therapist                       PT OP Goals     Row Name 10/15/19 1300          PT Short Term Goals    STG 1  Patient will be independent with initial HEP.  -GR     STG 1 Progress  Met  -GR     STG 2  Patient will maintain proper body mechanics for transfers and light object lifting for spine preservation.  -GR     STG 2 Progress  Ongoing  -GR        Long Term Goals    LTG 1  Patient will be independent with progressive HEP for long term management of current condition.  -GR     LTG 1 Progress  Ongoing  -GR     LTG 2  25% improvement in lumbar AROM all planes to ease transfers.  -GR     LTG 2 Progress  Ongoing  -GR     LTG 3  Patient will score </=26% disability on the modified CARLENE to indicate improved perceived positional tolerance.  -GR     LTG 3 Progress  Ongoing  -GR     LTG 4  Patient will demonstrate sit<>stand without facial grimacing to ease community reintegration.  -GR     LTG 4 Progress  Ongoing  -GR       User Key  (r) = Recorded By, (t) = Taken By, (c) = Cosigned By    Initials Name Provider Type    Jean Paul Christensen, PT Physical Therapist          Therapy Education  Education Details: continue with current plan of HEP every other day               Time Calculation:   Start Time: 1330  Stop Time: 1417  Time Calculation (min): 47 min  Total Timed Code Minutes- PT: 32 minute(s)  Therapy Charges for Today     Code Description Service Date Service Provider Modifiers Qty    04379795211  PT THER PROC EA 15 MIN 10/15/2019 Jean Paul Emery, PT GP 2    [id number]  PT ELECTRICAL STIM UNATTENDED 10/15/2019 Jean Paul Emery, PT  1    [id number]  PT HOT OR COLD PACK TREAT MCARE 10/15/2019 Jean Paul Emery, PT GP 1                    Jean Paul Emery PT  10/15/2019

## 2019-10-18 ENCOUNTER — HOSPITAL ENCOUNTER (OUTPATIENT)
Dept: PHYSICAL THERAPY | Facility: HOSPITAL | Age: 59
Setting detail: THERAPIES SERIES
Discharge: HOME | End: 2019-10-18

## 2019-10-18 DIAGNOSIS — M54.50 CHRONIC RIGHT-SIDED LOW BACK PAIN WITHOUT SCIATICA: Primary | ICD-10-CM

## 2019-10-18 DIAGNOSIS — G89.29 CHRONIC RIGHT-SIDED THORACIC BACK PAIN: ICD-10-CM

## 2019-10-18 DIAGNOSIS — M54.6 CHRONIC RIGHT-SIDED THORACIC BACK PAIN: ICD-10-CM

## 2019-10-18 DIAGNOSIS — G89.29 CHRONIC RIGHT-SIDED LOW BACK PAIN WITHOUT SCIATICA: Primary | ICD-10-CM

## 2019-10-18 PROCEDURE — 97110 THERAPEUTIC EXERCISES: CPT

## 2019-10-18 PROCEDURE — G0283 ELEC STIM OTHER THAN WOUND: HCPCS

## 2019-10-18 NOTE — THERAPY TREATMENT NOTE
Outpatient Physical Therapy Ortho Treatment Note  [facility]     Patient Name: Sarah Borges  : 1960  MRN: [id number]  Today's Date: 10/18/2019      Visit Date: 10/18/2019    Visit Dx:    ICD-10-CM ICD-9-CM   1. Chronic right-sided low back pain without sciatica M54.5 724.2    G89.29 338.29   2. Chronic right-sided thoracic back pain M54.6 724.1    G89.29 338.29       Patient Active Problem List   Diagnosis   • Hyperlipidemia   • Chronic venous insufficiency   • Family history of premature coronary artery disease   • Lower extremity edema        Past Medical History:   Diagnosis Date   • Agatston coronary artery calcium score less than 100     score was zero, 2016   • Cervical neuritis     C4-C5 of the left   • Chronic venous insufficiency    • Hyperlipidemia    • Lateral epicondylitis of right elbow    • Lumbar radiculopathy    • Neck pain         Past Surgical History:   Procedure Laterality Date   • HYSTERECTOMY     • NECK SURGERY     • VEIN LIGATION AND STRIPPING                         PT Assessment/Plan     Row Name 10/18/19 1500          PT Assessment    Assessment Comments  Pt report simproved hip pain, continues to have similar low back pain. Added HL clamshells, standing hip ab, and heel raises without reports of increased pain. Reviewed HEP with reports of good understanding. Continued IFC for pain control.  -RS        PT Plan    PT Plan Comments  Assess tolerance for adding back in resisted and standing activities, continue to progress as pt tolerance allows.  -RS       User Key  (r) = Recorded By, (t) = Taken By, (c) = Cosigned By    Initials Name Provider Type    RS Ivon Perla, PT Physical Therapist          Modalities     Row Name 10/18/19 1400             Ice    Ice Applied  Yes  -RS      Location  lumbar with IFC  -RS      Rx Minutes  15 mins  -RS         ELECTRICAL STIMULATION    Attended/Unattended  Unattended  -RS      Stimulation Type  IFC  -RS      Max mAmp  7  -RS       Location/Electrode Placement/Other  2 channels/4 electrodes/lumbar spine  -RS       PT E-Stim Unattended (Manual) Minutes  15  -RS        User Key  (r) = Recorded By, (t) = Taken By, (c) = Cosigned By    Initials Name Provider Type    RS Ivon Perla, PT Physical Therapist        OP Exercises     Row Name 10/18/19 1400             Subjective Comments    Subjective Comments  Hips are feeling pretty good, back is sore. Hasn't gotten much better.  -RS         Subjective Pain    Able to rate subjective pain?  yes  -RS      Pre-Treatment Pain Level  3  -RS         Total Minutes    63614 - PT Therapeutic Exercise Minutes  32  -RS         Exercise 1    Exercise Name 1  HS stretch stair  -RS      Cueing 1  Demo  -RS      Sets 1  1  -RS      Reps 1  3  -RS      Time 1  20 sec  -RS         Exercise 3    Exercise Name 3  DKTC with swiss ball  -RS      Cueing 3  Demo  -RS      Sets 3  1  -RS      Reps 3  15  -RS      Time 3  5 sec  -RS         Exercise 4    Exercise Name 4  PPT  -RS      Cueing 4  Demo  -RS      Sets 4  1  -RS      Reps 4  15  -RS      Time 4  5 sec  -RS         Exercise 5    Exercise Name 5  LTR  -RS      Cueing 5  Demo  -RS      Sets 5  1  -RS      Reps 5  15  -RS      Time 5  2 sec  -RS      Additional Comments  swiss ball  -RS         Exercise 7    Exercise Name 7  HL hip add with ball, with PPT  -RS      Cueing 7  Verbal;Tactile;Demo  -RS      Sets 7  1  -RS      Reps 7  15  -RS      Time 7  5 sec  -RS      Additional Comments  with glut set  -RS         Exercise 8    Exercise Name 8  standing HR's  -RS      Sets 8  2  -RS      Reps 8  10  -RS         Exercise 9    Exercise Name 9  HL clam with TA  -RS      Cueing 9  Demo  -RS      Sets 9  3  -RS      Reps 9  10  -RS      Additional Comments  uni, B RTB  -RS         Exercise 10    Exercise Name 10  standing hip abd  -RS      Cueing 10  Demo  -RS      Sets 10  1  -RS      Reps 10  10  -RS         Exercise 11    Exercise Name 11  Nustep  -RS       Time 11  5 min  -RS      Additional Comments  L5  -RS         Exercise 12    Exercise Name 12  Piriformis stretch  -RS      Cueing 12  Demo  -RS      Reps 12  3  -RS      Time 12  20 sec  -RS      Additional Comments  sitting  -RS         Exercise 13    Exercise Name 13  Gastroc stretch step  -RS      Cueing 13  Demo  -RS      Sets 13  1  -RS      Reps 13  3  -RS      Time 13  20 sec  -RS      Additional Comments  off step  -RS        User Key  (r) = Recorded By, (t) = Taken By, (c) = Cosigned By    Initials Name Provider Type    RS Ivon Perla, PT Physical Therapist                                          Time Calculation:   Start Time: 1442  Stop Time: 1533  Time Calculation (min): 51 min  Therapy Charges for Today     Code Description Service Date Service Provider Modifiers Qty    49233872430 HC PT THER PROC EA 15 MIN 10/18/2019 Ivon Perla, PT GP 2    [id number] HC PT ELECTRICAL STIM UNATTENDED 10/18/2019 Ivon Perla, PT  1    [id number] HC PT HOT OR COLD PACK TREAT MCARE 10/18/2019 Ivon Perla, PT GP 1                    Ivon Perla PT  10/18/2019

## 2019-10-22 ENCOUNTER — HOSPITAL ENCOUNTER (OUTPATIENT)
Dept: PHYSICAL THERAPY | Facility: HOSPITAL | Age: 59
Setting detail: THERAPIES SERIES
Discharge: HOME | End: 2019-10-22

## 2019-10-22 DIAGNOSIS — M54.6 CHRONIC RIGHT-SIDED THORACIC BACK PAIN: ICD-10-CM

## 2019-10-22 DIAGNOSIS — G89.29 CHRONIC RIGHT-SIDED LOW BACK PAIN WITHOUT SCIATICA: Primary | ICD-10-CM

## 2019-10-22 DIAGNOSIS — G89.29 CHRONIC RIGHT-SIDED THORACIC BACK PAIN: ICD-10-CM

## 2019-10-22 DIAGNOSIS — R26.89 DECREASED MOBILITY: ICD-10-CM

## 2019-10-22 DIAGNOSIS — Z51.89 AFTERCARE: ICD-10-CM

## 2019-10-22 DIAGNOSIS — M54.50 CHRONIC RIGHT-SIDED LOW BACK PAIN WITHOUT SCIATICA: Primary | ICD-10-CM

## 2019-10-22 PROCEDURE — G0283 ELEC STIM OTHER THAN WOUND: HCPCS | Performed by: PHYSICAL THERAPIST

## 2019-10-22 PROCEDURE — 97110 THERAPEUTIC EXERCISES: CPT | Performed by: PHYSICAL THERAPIST

## 2019-10-22 NOTE — THERAPY TREATMENT NOTE
Outpatient Physical Therapy Ortho Treatment Note  [facility]     Patient Name: Sarah Borges  : 1960  MRN: [id number]  Today's Date: 10/22/2019      Visit Date: 10/22/2019    Visit Dx:    ICD-10-CM ICD-9-CM   1. Chronic right-sided low back pain without sciatica M54.5 724.2    G89.29 338.29   2. Chronic right-sided thoracic back pain M54.6 724.1    G89.29 338.29   3. Aftercare Z51.89 V58.9   4. Decreased mobility R26.89 781.99       Patient Active Problem List   Diagnosis   • Hyperlipidemia   • Chronic venous insufficiency   • Family history of premature coronary artery disease   • Lower extremity edema        Past Medical History:   Diagnosis Date   • Agatston coronary artery calcium score less than 100     score was zero, 2016   • Cervical neuritis     C4-C5 of the left   • Chronic venous insufficiency    • Hyperlipidemia    • Lateral epicondylitis of right elbow    • Lumbar radiculopathy    • Neck pain         Past Surgical History:   Procedure Laterality Date   • HYSTERECTOMY     • NECK SURGERY     • VEIN LIGATION AND STRIPPING                         PT Assessment/Plan     Row Name 10/22/19 1154          PT Assessment    Assessment Comments  Patient able to advance with light weights in standing and more erect posture. She verbalizes plan to purchase home TENS for home pain control upon dc. Her progress so far is fair considering the extent of her procedure.  -GR        PT Plan    PT Plan Comments  Continue POC.  -GR       User Key  (r) = Recorded By, (t) = Taken By, (c) = Cosigned By    Initials Name Provider Type    GR Jean Paul Emery, PT Physical Therapist          Modalities     Row Name 10/22/19 1000             Ice    Ice Applied  Yes  -GR      Location  lumbar with IFC  -GR      Rx Minutes  15 mins  -GR         ELECTRICAL STIMULATION    Attended/Unattended  Unattended  -GR      Stimulation Type  IFC  -GR      Max mAmp  8  -GR      Location/Electrode Placement/Other  2 channels/4  electrodes/lumbar spine  -GR       PT E-Stim Unattended (Manual) Minutes  15  -GR        User Key  (r) = Recorded By, (t) = Taken By, (c) = Cosigned By    Initials Name Provider Type    GR Jean Paul Emery, PT Physical Therapist        OP Exercises     Row Name 10/22/19 1000             Subjective Comments    Subjective Comments  Sat on bleachers for a race car race with a small back support cushion so sore from this but overall was no worse after last visit.  -GR         Subjective Pain    Able to rate subjective pain?  yes  -GR      Pre-Treatment Pain Level  3  -GR         Total Minutes    10406 - PT Therapeutic Exercise Minutes  30  -GR         Exercise 1    Exercise Name 1  HS stretch stair  -GR      Cueing 1  Demo  -GR      Sets 1  1  -GR      Reps 1  3  -GR      Time 1  20 sec  -GR         Exercise 3    Exercise Name 3  DKTC with swiss ball  -GR      Cueing 3  Demo  -GR      Sets 3  2  -GR      Reps 3  10  -GR      Time 3  5 sec  -GR         Exercise 4    Exercise Name 4  PPT  -GR      Cueing 4  Demo  -GR      Sets 4  2  -GR      Reps 4  10  -GR      Time 4  5 sec  -GR         Exercise 5    Exercise Name 5  LTR  -GR      Cueing 5  Demo  -GR      Sets 5  2  -GR      Reps 5  10  -GR      Time 5  2 sec  -GR      Additional Comments  swiss ball  -GR         Exercise 7    Exercise Name 7  HL hip add with ball, with PPT  -GR      Cueing 7  Verbal;Tactile;Demo  -GR      Sets 7  2  -GR      Reps 7  10  -GR      Time 7  5 sec  -GR         Exercise 8    Exercise Name 8  standing HR's  -GR      Sets 8  2  -GR      Reps 8  10  -GR         Exercise 9    Exercise Name 9  HL clam with TA  -GR      Cueing 9  Demo  -GR      Sets 9  3  -GR      Reps 9  10  -GR      Additional Comments  uni, B RTB  -GR         Exercise 10    Exercise Name 10  standing hip abd  -GR      Cueing 10  Demo  -GR      Sets 10  2  -GR      Reps 10  10  -GR      Additional Comments  2#  -GR         Exercise 11    Exercise Name 11  Nustep  -GR       Time 11  5 min  -GR      Additional Comments  L5  -GR         Exercise 12    Exercise Name 12  Piriformis stretch  -GR      Cueing 12  Demo  -GR      Reps 12  3  -GR      Time 12  20 sec  -GR      Additional Comments  sitting  -GR         Exercise 13    Exercise Name 13  Gastroc stretch step  -GR      Cueing 13  Demo  -GR      Sets 13  1  -GR      Reps 13  3  -GR      Time 13  20 sec  -GR      Additional Comments  off step  -GR        User Key  (r) = Recorded By, (t) = Taken By, (c) = Cosigned By    Initials Name Provider Type    GR Jean Paul Emery, PT Physical Therapist                                          Time Calculation:   Start Time: 1015  Stop Time: 1100  Time Calculation (min): 45 min  Total Timed Code Minutes- PT: 30 minute(s)  Therapy Charges for Today     Code Description Service Date Service Provider Modifiers Qty    58097126538  PT THER PROC EA 15 MIN 10/22/2019 Jean Paul Emery, PT GP 2    [id number]  PT ELECTRICAL STIM UNATTENDED 10/22/2019 Jean Paul Emery, PT  1                    Jean Paul Emery, PT  10/22/2019

## 2019-10-24 ENCOUNTER — APPOINTMENT (OUTPATIENT)
Dept: PHYSICAL THERAPY | Facility: HOSPITAL | Age: 59
End: 2019-10-24

## 2019-10-25 ENCOUNTER — HOSPITAL ENCOUNTER (OUTPATIENT)
Dept: PHYSICAL THERAPY | Facility: HOSPITAL | Age: 59
Setting detail: THERAPIES SERIES
Discharge: HOME | End: 2019-10-25

## 2019-10-25 DIAGNOSIS — M54.6 CHRONIC RIGHT-SIDED THORACIC BACK PAIN: ICD-10-CM

## 2019-10-25 DIAGNOSIS — M54.50 CHRONIC RIGHT-SIDED LOW BACK PAIN WITHOUT SCIATICA: Primary | ICD-10-CM

## 2019-10-25 DIAGNOSIS — Z51.89 AFTERCARE: ICD-10-CM

## 2019-10-25 DIAGNOSIS — G89.29 CHRONIC RIGHT-SIDED LOW BACK PAIN WITHOUT SCIATICA: Primary | ICD-10-CM

## 2019-10-25 DIAGNOSIS — R26.89 DECREASED MOBILITY: ICD-10-CM

## 2019-10-25 DIAGNOSIS — G89.29 CHRONIC RIGHT-SIDED THORACIC BACK PAIN: ICD-10-CM

## 2019-10-25 PROCEDURE — 97110 THERAPEUTIC EXERCISES: CPT

## 2019-10-25 NOTE — THERAPY TREATMENT NOTE
Outpatient Physical Therapy Ortho Treatment Note  [facility]     Patient Name: Sarah Borges  : 1960  MRN: [id number]  Today's Date: 10/25/2019      Visit Date: 10/25/2019    Visit Dx:    ICD-10-CM ICD-9-CM   1. Chronic right-sided low back pain without sciatica M54.5 724.2    G89.29 338.29   2. Chronic right-sided thoracic back pain M54.6 724.1    G89.29 338.29   3. Aftercare Z51.89 V58.9   4. Decreased mobility R26.89 781.99       Patient Active Problem List   Diagnosis   • Hyperlipidemia   • Chronic venous insufficiency   • Family history of premature coronary artery disease   • Lower extremity edema        Past Medical History:   Diagnosis Date   • Agatston coronary artery calcium score less than 100     score was zero, 2016   • Cervical neuritis     C4-C5 of the left   • Chronic venous insufficiency    • Hyperlipidemia    • Lateral epicondylitis of right elbow    • Lumbar radiculopathy    • Neck pain         Past Surgical History:   Procedure Laterality Date   • HYSTERECTOMY     • NECK SURGERY     • VEIN LIGATION AND STRIPPING                         PT Assessment/Plan     Row Name 10/25/19 1351          PT Assessment    Assessment Comments  Issue scap ret next visit. Patient is in the process of purchasing Tens unit.   -WS       User Key  (r) = Recorded By, (t) = Taken By, (c) = Cosigned By    Initials Name Provider Type    Jr Gonzalez PTA Physical Therapy Assistant          Modalities     Row Name 10/25/19 9490             Ice    Patient reports will apply ice at home to involved area  Yes  -WS        User Key  (r) = Recorded By, (t) = Taken By, (c) = Cosigned By    Initials Name Provider Type    Jr Gonzalez PTA Physical Therapy Assistant        OP Exercises     Row Name 10/25/19 1337             Subjective Comments    Subjective Comments  back pain better today than yesterday  -WS         Subjective Pain    Able to rate subjective pain?  no  -WS      Pre-Treatment Pain  Level  2  -WS         Total Minutes    24456 - PT Therapeutic Exercise Minutes  30  -WS         Exercise 1    Exercise Name 1  HS stretch stair  -WS      Cueing 1  Demo  -WS      Sets 1  1  -WS      Reps 1  3  -WS      Time 1  20 sec  -WS         Exercise 3    Exercise Name 3  DKTC with swiss ball  -WS      Cueing 3  Demo  -WS      Sets 3  2  -WS      Reps 3  10  -WS      Time 3  5 sec  -WS         Exercise 4    Exercise Name 4  PPT  -WS      Cueing 4  Demo  -WS      Sets 4  2  -WS      Reps 4  10  -WS      Time 4  5 sec  -WS         Exercise 5    Exercise Name 5  LTR  -WS      Cueing 5  Demo  -WS      Sets 5  2  -WS      Reps 5  10  -WS      Time 5  2 sec  -WS      Additional Comments  swiss ball  -WS         Exercise 7    Exercise Name 7  HL hip add with ball, with PPT  -WS      Cueing 7  Verbal;Tactile;Demo  -WS      Sets 7  2  -WS      Reps 7  10  -WS      Time 7  5 sec  -WS         Exercise 8    Exercise Name 8  standing HR's  -WS      Sets 8  2  -WS      Reps 8  10  -WS         Exercise 9    Exercise Name 9  HL clam with TA  -WS      Cueing 9  Demo  -WS      Sets 9  3  -WS      Reps 9  10  -WS      Additional Comments  uni B RTB  -WS         Exercise 10    Exercise Name 10  standing hip abd  -WS      Cueing 10  Demo  -WS      Sets 10  2  -WS      Reps 10  10  -WS      Additional Comments  2#  -WS         Exercise 11    Exercise Name 11  Nustep UE/LE  -WS      Time 11  5 min  -WS      Additional Comments  L5  -WS         Exercise 12    Exercise Name 12  Piriformis stretch  -WS      Cueing 12  Demo  -WS      Reps 12  3  -WS      Time 12  20 sec  -WS      Additional Comments  sitting  -WS         Exercise 13    Exercise Name 13  Gastroc stretch step  -WS      Cueing 13  Demo  -WS      Sets 13  1  -WS      Reps 13  3  -WS      Time 13  20 sec  -WS      Additional Comments  off step  -WS         Exercise 14    Exercise Name 14  scap ret  -WS      Reps 14  15  -WS      Additional Comments  YTB  -WS        User Key   (r) = Recorded By, (t) = Taken By, (c) = Cosigned By    Initials Name Provider Type    Jr Gonzalez PTA Physical Therapy Assistant                       PT OP Goals     Row Name 10/25/19 1300          PT Short Term Goals    STG 1  Patient will be independent with initial HEP.  -     STG 1 Progress  Met  -     STG 2  Patient will maintain proper body mechanics for transfers and light object lifting for spine preservation.  -WS     STG 2 Progress  Ongoing  -        Long Term Goals    LTG 1  Patient will be independent with progressive HEP for long term management of current condition.  -WS     LTG 1 Progress  Ongoing  -     LTG 2  25% improvement in lumbar AROM all planes to ease transfers.  -WS     LTG 2 Progress  Ongoing  -WS     LTG 3  Patient will score </=26% disability on the modified CARLENE to indicate improved perceived positional tolerance.  -     LTG 3 Progress  Ongoing  -     LTG 4  Patient will demonstrate sit<>stand without facial grimacing to ease community reintegration.  -     LTG 4 Progress  Ongoing  -       User Key  (r) = Recorded By, (t) = Taken By, (c) = Cosigned By    Initials Name Provider Type    Jr Gonzalez PTA Physical Therapy Assistant          Therapy Education  Given: HEP, Symptoms/condition management, Pain management, Posture/body mechanics  Program: Reinforced  How Provided: Verbal  Provided to: Patient              Time Calculation:   Start Time: 1330  Stop Time: 1400  Time Calculation (min): 30 min  Therapy Charges for Today     Code Description Service Date Service Provider Modifiers Qty    63767622539  PT THER PROC EA 15 MIN 10/25/2019 Jr Hanks PTA GP 2                    Jr Hanks PTA  10/25/2019

## 2019-10-29 ENCOUNTER — HOSPITAL ENCOUNTER (OUTPATIENT)
Dept: PHYSICAL THERAPY | Facility: HOSPITAL | Age: 59
Setting detail: THERAPIES SERIES
Discharge: HOME | End: 2019-10-29

## 2019-10-29 DIAGNOSIS — R26.89 DECREASED MOBILITY: ICD-10-CM

## 2019-10-29 DIAGNOSIS — G89.29 CHRONIC RIGHT-SIDED THORACIC BACK PAIN: ICD-10-CM

## 2019-10-29 DIAGNOSIS — M54.50 CHRONIC RIGHT-SIDED LOW BACK PAIN WITHOUT SCIATICA: Primary | ICD-10-CM

## 2019-10-29 DIAGNOSIS — G89.29 CHRONIC RIGHT-SIDED LOW BACK PAIN WITHOUT SCIATICA: Primary | ICD-10-CM

## 2019-10-29 DIAGNOSIS — Z51.89 AFTERCARE: ICD-10-CM

## 2019-10-29 DIAGNOSIS — M54.6 CHRONIC RIGHT-SIDED THORACIC BACK PAIN: ICD-10-CM

## 2019-10-29 PROCEDURE — G0283 ELEC STIM OTHER THAN WOUND: HCPCS | Performed by: PHYSICAL THERAPIST

## 2019-10-29 PROCEDURE — 97530 THERAPEUTIC ACTIVITIES: CPT | Performed by: PHYSICAL THERAPIST

## 2019-10-29 PROCEDURE — 97110 THERAPEUTIC EXERCISES: CPT | Performed by: PHYSICAL THERAPIST

## 2019-10-29 NOTE — THERAPY RE-EVALUATION
Outpatient Physical Therapy Ortho Re-Evaluation  [facility]     Patient Name: Sarah Borges  : 1960  MRN: [id number]  Today's Date: 10/29/2019      Visit Date: 10/29/2019    Patient Active Problem List   Diagnosis   • Hyperlipidemia   • Chronic venous insufficiency   • Family history of premature coronary artery disease   • Lower extremity edema        Past Medical History:   Diagnosis Date   • Agatston coronary artery calcium score less than 100     score was zero, 2016   • Cervical neuritis     C4-C5 of the left   • Chronic venous insufficiency    • Hyperlipidemia    • Lateral epicondylitis of right elbow    • Lumbar radiculopathy    • Neck pain         Past Surgical History:   Procedure Laterality Date   • HYSTERECTOMY     • NECK SURGERY     • VEIN LIGATION AND STRIPPING         Visit Dx:     ICD-10-CM ICD-9-CM   1. Chronic right-sided low back pain without sciatica M54.5 724.2    G89.29 338.29   2. Chronic right-sided thoracic back pain M54.6 724.1    G89.29 338.29   3. Aftercare Z51.89 V58.9   4. Decreased mobility R26.89 781.99             PT Ortho     Row Name 10/29/19 1000       Myotomal Screen- Lower Quarter Clearing    Hip flexion (L2)  Bilateral:;4+ (Good +)  -GR    Knee extension (L3)  Bilateral:;4 (Good)  -GR    Ankle DF (L4)  Bilateral:;4+ (Good +)  -GR    Great toe extension (L5)  Bilateral:;5 (Normal)  -GR    Knee flexion (S2)  Bilateral:;4 (Good)  -GR       Lumbar ROM Screen- Lower Quarter Clearing    Lumbar Flexion  Impaired to distal femur  -GR    Lumbar Extension  Impaired 25% impaired  -GR    Lumbar Lateral Flexion  Impaired to distal femur B  -GR    Lumbar Rotation  Impaired 50% impaired  -GR      User Key  (r) = Recorded By, (t) = Taken By, (c) = Cosigned By    Initials Name Provider Type    Jean Paul Christensen, PT Physical Therapist                            PT OP Goals     Row Name 10/29/19 1000          PT Short Term Goals    STG 1  Patient will be independent with  initial HEP.  -GR     STG 1 Progress  Met  -GR     STG 2  Patient will maintain proper body mechanics for transfers and light object lifting for spine preservation.  -GR     STG 2 Progress  Partially Met  -GR     STG 2 Progress Comments  states she is modifying activity such as laundry; educated on proper mechanics on 10/29  -GR        Long Term Goals    LTG 1  Patient will be independent with progressive HEP for long term management of current condition.  -GR     LTG 1 Progress  Ongoing  -GR     LTG 2  25% improvement in lumbar AROM all planes to ease transfers.  -GR     LTG 2 Progress  Ongoing  -GR     LTG 2 Progress Comments  flexion is worse; extension is better; all others unchanged  -GR     LTG 3  Patient will score </=26% disability on the modified CARLENE to indicate improved perceived positional tolerance.  -GR     LTG 3 Progress  Ongoing  -GR     LTG 3 Progress Comments  38%  -GR     LTG 4  Patient will demonstrate sit<>stand without facial grimacing to ease community reintegration.  -GR     LTG 4 Progress  Ongoing  -GR     LTG 4 Progress Comments  approx 3/10 on average  -GR       User Key  (r) = Recorded By, (t) = Taken By, (c) = Cosigned By    Initials Name Provider Type    GR Jean Paul Emery, PT Physical Therapist          PT Assessment/Plan     Row Name 10/29/19 5282          PT Assessment    Assessment Comments  Ms. Borges has attended 9 sessions of skilled PT s/p thoracolumbar fusion. She is now >12 weeks post op.  She is experiencing pain similar to her pre-operative status in the lumbar pain and L radicular pattern at times 10/10.  AROM has improved with lumbar extension otherwise stagnant and/or worse.  Her lower quarter strength is improving.  She has yet to return to work.  Managing pain with tylenol, PT and IFC.  She may benefit from trial of aquatic PT to offload her spine.  Will continue on land until set up for aquatic therapy.  -GR        PT Plan    PT Plan Comments  Aquatic therapy 1x/week  for 4 visits as able.  Continue on land until scheduled.  -GR       User Key  (r) = Recorded By, (t) = Taken By, (c) = Cosigned By    Initials Name Provider Type    Jean Paul Christensen, PT Physical Therapist          Modalities     Row Name 10/29/19 1000             Ice    Ice Applied  Yes  -GR      Location  lumbar with IFC  -GR      Rx Minutes  15 mins  -GR         ELECTRICAL STIMULATION    Attended/Unattended  Unattended  -GR      Stimulation Type  IFC  -GR      Max mAmp  8  -GR      Location/Electrode Placement/Other  2 channels/4 electrodes/lumbar spine  -GR       PT E-Stim Unattended (Manual) Minutes  15  -GR        User Key  (r) = Recorded By, (t) = Taken By, (c) = Cosigned By    Initials Name Provider Type    Jean Paul Christensen, PT Physical Therapist        OP Exercises     Row Name 10/29/19 1000             Subjective Comments    Subjective Comments  The pain, at times, will bring her to her knees and it is the same preoperative pain in the back and hip.  -GR         Subjective Pain    Able to rate subjective pain?  yes  -GR      Pre-Treatment Pain Level  4  -GR         Total Minutes    32578 - PT Therapeutic Exercise Minutes  20  -GR      28376 - PT Therapeutic Activity Minutes  10  -GR         Exercise 1    Exercise Name 1  HS stretch from 90/90 with gentle pull of strap  -GR      Cueing 1  Demo  -GR      Sets 1  1  -GR      Reps 1  3  -GR      Time 1  20 sec  -GR         Exercise 3    Exercise Name 3  DKTC with swiss ball  -GR      Cueing 3  Demo  -GR      Sets 3  2  -GR      Reps 3  10  -GR      Time 3  5 sec  -GR         Exercise 5    Exercise Name 5  LTR  -GR      Cueing 5  Demo  -GR      Sets 5  2  -GR      Reps 5  10  -GR      Time 5  2 sec  -GR      Additional Comments  swiss ball  -GR         Exercise 11    Exercise Name 11  Nustep UE/LE  -GR      Time 11  5 min  -GR      Additional Comments  L3  -GR         Exercise 15    Exercise Name 15  Body mechanics training: power lift, golfer's  pick-up, carrying items close to the body  -JB        User Key  (r) = Recorded By, (t) = Taken By, (c) = Cosigned By    Initials Name Provider Type    Jean Paul Christensen, PT Physical Therapist                        Outcome Measure Options: Modifed Owestry  Modified Oswestry  Modified Oswestry Score/Comments: 38% disability      Time Calculation:     Start Time: 1020  Stop Time: 1105  Time Calculation (min): 45 min  Total Timed Code Minutes- PT: 30 minute(s)     Therapy Charges for Today     Code Description Service Date Service Provider Modifiers Qty    64426611363  PT THER PROC EA 15 MIN 10/29/2019 Jean Paul Emery, PT GP 1    31639550060 HC PT THERAPEUTIC ACT EA 15 MIN 10/29/2019 Jean Paul Emery, PT GP 1    [id number]  PT ELECTRICAL STIM UNATTENDED 10/29/2019 Jean Paul Emery, PT  1    [id number]  PT HOT OR COLD PACK TREAT MCARE 10/29/2019 Jean Paul Emery, PT GP 1          PT G-Codes  Outcome Measure Options: Modifed Owestry  Modified Oswestry Score/Comments: 38% disability         Jean Paul Emery, PT  10/29/2019

## 2019-10-31 ENCOUNTER — APPOINTMENT (OUTPATIENT)
Dept: PHYSICAL THERAPY | Facility: HOSPITAL | Age: 59
End: 2019-10-31

## 2019-11-01 ENCOUNTER — APPOINTMENT (OUTPATIENT)
Dept: PHYSICAL THERAPY | Facility: HOSPITAL | Age: 59
End: 2019-11-01

## 2019-11-04 ENCOUNTER — TREATMENT (OUTPATIENT)
Dept: PHYSICAL THERAPY | Facility: CLINIC | Age: 59
End: 2019-11-04

## 2019-11-04 DIAGNOSIS — G89.29 CHRONIC RIGHT-SIDED THORACIC BACK PAIN: ICD-10-CM

## 2019-11-04 DIAGNOSIS — Z51.89 AFTERCARE: ICD-10-CM

## 2019-11-04 DIAGNOSIS — R26.89 DECREASED MOBILITY: ICD-10-CM

## 2019-11-04 DIAGNOSIS — M54.50 CHRONIC RIGHT-SIDED LOW BACK PAIN WITHOUT SCIATICA: Primary | ICD-10-CM

## 2019-11-04 DIAGNOSIS — G89.29 CHRONIC RIGHT-SIDED LOW BACK PAIN WITHOUT SCIATICA: Primary | ICD-10-CM

## 2019-11-04 DIAGNOSIS — M54.6 CHRONIC RIGHT-SIDED THORACIC BACK PAIN: ICD-10-CM

## 2019-11-04 PROCEDURE — 97113 AQUATIC THERAPY/EXERCISES: CPT | Performed by: PHYSICAL THERAPIST

## 2019-11-04 NOTE — PROGRESS NOTES
Physical Therapy Daily Progress Note    Patient: Sarah Borges   : 1960  Diagnosis/ICD-10 Code:  Chronic right-sided low back pain without sciatica [M54.5, G89.29]  Referring practitioner: Ricardo Skelton*  Date of Initial Visit:   Type: THERAPY  Noted: 10/1/2019  Today's Date: 2019  Patient seen for 10 sessions             Subjective   My pain is usually a 2-3/10. Occasionally I have a severe pain in the LB that feels like  something has shifted and almost takes me to my knees.     Objective     AQUATICS LE    Water Walk  Forward/Sideways/ Backwards X 2 laps each  Stretch  1  HS standing 20” X 3 each  Stretch 2  Calf 20” X2 ea  Stretch 3  Piriformis 20” X 3 ea  Stretch Other 1 Quads 20” X 3 ea  Stretch Other 2 gentle DKTC at rail w/ noodle support 15” X 2  Vertical Traction LN X 1 min X 2  Abdominals   Small noodle X 10  Clams   --  Hip Abd/Add  2 X 10 ea  Hip Flex/Ext  --  March in Place 15X   Mini Squat  --  Toe/Heel Raises --  Uni-Squat  --  Uni-Clock  --  Step Ups  --  Bicycle   1 min seated and 1 min suspended   Flutter/Scissor  --  Exercise 1  --  Exercise 2  --  Exercise 3  --      Assessment/Plan  Today is first session of aquatic PT. Started primarily with LE stretches and beginning core strengthening, all tolerated well. Verbal and tactile cues for abdominal bracing as needed. Sarah plans to return to work () in 2 days.    Progress per Plan of Care and Progress strengthening /stabilization /functional activity               Timed:  Aquatic Therapy    40     mins [id number];    Tom Reveles, PT  Physical Therapist

## 2019-11-11 ENCOUNTER — TREATMENT (OUTPATIENT)
Dept: PHYSICAL THERAPY | Facility: CLINIC | Age: 59
End: 2019-11-11

## 2019-11-11 DIAGNOSIS — M54.50 CHRONIC RIGHT-SIDED LOW BACK PAIN WITHOUT SCIATICA: Primary | ICD-10-CM

## 2019-11-11 DIAGNOSIS — M54.6 CHRONIC RIGHT-SIDED THORACIC BACK PAIN: ICD-10-CM

## 2019-11-11 DIAGNOSIS — G89.29 CHRONIC RIGHT-SIDED THORACIC BACK PAIN: ICD-10-CM

## 2019-11-11 DIAGNOSIS — R26.89 DECREASED MOBILITY: ICD-10-CM

## 2019-11-11 DIAGNOSIS — G89.29 CHRONIC RIGHT-SIDED LOW BACK PAIN WITHOUT SCIATICA: Primary | ICD-10-CM

## 2019-11-11 DIAGNOSIS — Z51.89 AFTERCARE: ICD-10-CM

## 2019-11-11 PROCEDURE — 97113 AQUATIC THERAPY/EXERCISES: CPT | Performed by: PHYSICAL THERAPIST

## 2019-11-11 NOTE — PROGRESS NOTES
"Physical Therapy Daily Progress Note    Patient: Sarah Borges   : 1960  Diagnosis/ICD-10 Code:  Chronic right-sided low back pain without sciatica [M54.5, G89.29]  Referring practitioner: Ricardo Skelton*  Date of Initial Visit:   Type: THERAPY  Noted: 10/1/2019  Today's Date: 2019  Patient seen for 11 sessions             Subjective   pt reports feeling of achiness in bilateral hips. she says pain is 5/10 and primarily left hip. she reports she does have intermittent low back pain exacerbation.    Objective     AQUATICS LE    Water Walk  forward, sideways 2 laps each   Stretch  1  HS standing 20\"x 3 each  Stretch 2  Calf 20\" x 2 each  Stretch 3  Piriformis 20\" x 3 each   Stretch Other 1 Quads 20\"  Stretch Other 2 DKTC at rail with noodle support  Vertical Traction LN 2 min suspended  Abdominals   Multicolored DB press down 10x  Clams   LN suspended 10x  Hip Abd/Add  2 x 10  Hip Flex/Ext  -  March in Place 15x  Mini Squat  -  Toe/Heel Raises -  Uni-Squat  -  Uni-Clock  -  Step Ups  -  Bicycle   1 min LN suspended   Flutter/Scissor  12/12x LN suspended   Exercise 1  aqualogics push/pull 20x; aqualogics stirs 10/10x cw/ccw  Exercise 2  12x LN suspended tuckups   Exercise 3  -      Assessment/Plan  pt did well with exercise progressions today. reported good feeling of core tightening during tuckups and aqualogics exercises. she had some increased pain with pelvic smiles reporting that typically rotational movements like this give her more pain. Stopped this exercise. Pt responded well to increased exercises during LN suspended position.    will continue to progress per pain tolerance to gradually increase core strength and lumbar mobility.    Progress per Plan of Care and Progress strengthening /stabilization /functional activity               Timed:  Aquatic Therapy    30     mins [id number];    Brannon Sandoval, PT DPT  Physical Therapist        "

## 2019-11-13 ENCOUNTER — TREATMENT (OUTPATIENT)
Dept: PHYSICAL THERAPY | Facility: CLINIC | Age: 59
End: 2019-11-13

## 2019-11-13 DIAGNOSIS — G89.29 CHRONIC RIGHT-SIDED THORACIC BACK PAIN: ICD-10-CM

## 2019-11-13 DIAGNOSIS — M54.50 CHRONIC RIGHT-SIDED LOW BACK PAIN WITHOUT SCIATICA: Primary | ICD-10-CM

## 2019-11-13 DIAGNOSIS — M54.6 CHRONIC RIGHT-SIDED THORACIC BACK PAIN: ICD-10-CM

## 2019-11-13 DIAGNOSIS — R26.89 DECREASED MOBILITY: ICD-10-CM

## 2019-11-13 DIAGNOSIS — G89.29 CHRONIC RIGHT-SIDED LOW BACK PAIN WITHOUT SCIATICA: Primary | ICD-10-CM

## 2019-11-13 DIAGNOSIS — Z51.89 AFTERCARE: ICD-10-CM

## 2019-11-13 PROCEDURE — 97113 AQUATIC THERAPY/EXERCISES: CPT | Performed by: PHYSICAL THERAPIST

## 2019-11-13 NOTE — PROGRESS NOTES
"Physical Therapy Daily Progress Note    Patient: Sarah Borges   : 1960  Diagnosis/ICD-10 Code:  Chronic right-sided low back pain without sciatica [M54.5, G89.29]  Referring practitioner: Ricardo Skelton*  Date of Initial Visit: No linked episodes  Today's Date: 2019  Patient seen for Visit count could not be calculated. Make sure you are using a visit which is associated with an episode. sessions             Subjective   pt reports that she feels good today pain is only 2/10. she felt much better following last therapy session and reported that her back felt looser.    Objective     AQUATICS LE  Water Walk                 forward, sideways 2 laps each   Stretch  1                     HS standing 20\"x 3 each  Stretch 2                      Calf 20\" x 2 each  Stretch 3                      Piriformis 20\" x 3 each   Stretch Other 1           Quads 20\"  Stretch Other 2           DKTC at rail with noodle support  Vertical Traction          LN 2 min suspended  Abdominals                 white DB press down 12x, 10x obliques  Clams                          LN suspended 12x  Hip Abd/Add                2 x 10 LN assist  Hip Flex/Ext                 12x LN assist  March in Place            15x  Mini Squat                   sit to stand from bench glute squeeze 10x  Toe/Heel Raises         -  Uni-Squat                    SN single leg press down 10x each  Uni-Clock                    -  Step Ups                     -  Bicycle                         1 min LN suspended   Flutter/Scissor             12/12x LN suspended   Exercise 1                   aqualogics push/pull 20x; aqualogics stirs 10/10x cw/ccw  Exercise 2                   12x LN suspended tuckups   Exercise 3                   lunge position short trunk rotation arms extended 10x each    Assessment/Plan  Progressed repetitions and resistance of all interventions. Added lunge position with small trunk rotation. Reviewed good sit to stand " technique with glute squeeze to stand. Pt reported she felt much looser following exercise progressions today.    Progress per Plan of Care and Progress strengthening /stabilization /functional activity               Timed:  Aquatic Therapy    38     mins 29799;    Brannon Sandoval, PT DPT  Physical Therapist

## 2019-11-20 ENCOUNTER — TREATMENT (OUTPATIENT)
Dept: PHYSICAL THERAPY | Facility: CLINIC | Age: 59
End: 2019-11-20

## 2019-11-20 PROCEDURE — 97113 AQUATIC THERAPY/EXERCISES: CPT | Performed by: PHYSICAL THERAPIST

## 2019-11-20 NOTE — PROGRESS NOTES
"Physical Therapy Daily Progress Note    Patient: Sarah Borges   : 1960  Diagnosis/ICD-10 Code:  No primary diagnosis found.  Referring practitioner: Ricardo Skelton*  Date of Initial Visit:   Type: THERAPY  Noted: 10/1/2019  Today's Date: 2019  Patient seen for 12 sessions             Subjective    Pt continues to report pain 0/10. She says she just has occasional general low back tightness but pain remains minimal to none.    Objective     AQUATICS LE    Water Walk                 forward, sideways 2 laps each   Stretch  1                     HS standing 20\"x 3 each  Stretch 2                      Calf 20\" x 2 each  Stretch 3                      Piriformis 20\" x 3 each   Stretch Other 1           Quads 20\"  Stretch Other 2           DKTC at rail with noodle support  Vertical Traction          LN 2 min suspended  Abdominals                 white DB press down 12x, 10x obliques  Clams                          LN suspended 12x  Hip Abd/Add                2 x 10 LN assist  Hip Flex/Ext                 12x LN assist  March in Place            15x  Mini Squat                   sit to stand from bench glute squeeze 10x  Toe/Heel Raises         -  Uni-Squat                    SN single leg press down 10x each  Uni-Clock                    -  Step Ups                     -  Bicycle                         1 min LN suspended   Flutter/Scissor             15/15x LN suspended   Exercise 1                   aqualogics push/pull 20x; aqualogics stirs 10/10x cw/ccw  Exercise 2                   15x LN suspended tuckups   Exercise 3                   lunge position short trunk rotation arms extended 10x each    Assessment/Plan  Pt has made excellent progress with skilled therapy. She requires only verbal cuing for familiar exercises. Progressed to more functional movements as well as increased difficulty lumbar spine strengthening. Pt has 5 remaining visits and should be progressed to independence with HEp " during this time barring any significant change in her low back pain.    Progress per Plan of Care and Progress strengthening /stabilization /functional activity               Timed:  Aquatic Therapy    23     mins 15606;    Brannon Sandoval, PT DPT  Physical Therapist

## 2019-11-25 ENCOUNTER — TREATMENT (OUTPATIENT)
Dept: PHYSICAL THERAPY | Facility: CLINIC | Age: 59
End: 2019-11-25

## 2019-11-25 DIAGNOSIS — G89.29 CHRONIC RIGHT-SIDED THORACIC BACK PAIN: ICD-10-CM

## 2019-11-25 DIAGNOSIS — Z51.89 AFTERCARE: ICD-10-CM

## 2019-11-25 DIAGNOSIS — G89.29 CHRONIC RIGHT-SIDED LOW BACK PAIN WITHOUT SCIATICA: Primary | ICD-10-CM

## 2019-11-25 DIAGNOSIS — R26.89 DECREASED MOBILITY: ICD-10-CM

## 2019-11-25 DIAGNOSIS — M54.50 CHRONIC RIGHT-SIDED LOW BACK PAIN WITHOUT SCIATICA: Primary | ICD-10-CM

## 2019-11-25 DIAGNOSIS — M54.6 CHRONIC RIGHT-SIDED THORACIC BACK PAIN: ICD-10-CM

## 2019-11-25 PROCEDURE — 97113 AQUATIC THERAPY/EXERCISES: CPT | Performed by: PHYSICAL THERAPIST

## 2019-11-25 NOTE — PROGRESS NOTES
"Physical Therapy Daily Progress Note    Patient: Sarah Borges   : 1960  Diagnosis/ICD-10 Code:  Chronic right-sided low back pain without sciatica [M54.5, G89.29]  Referring practitioner: Ricardo Skelton*  Date of Initial Visit: No linked episodes  Today's Date: 2019  Patient seen for Visit count could not be calculated. Make sure you are using a visit which is associated with an episode. sessions             Subjective   pt reports she has some increased low back pain today reporting 4/10 with no referred symptoms. she indicates no causative factors she just felt that pain increased yesterday.    Objective     AQUATICS LE    Water Walk                 forward, sideways 2 laps each   Stretch  1                     HS standing 20\"x 3 each  Stretch 2                      Calf 20\" x 2 each  Stretch 3                      Piriformis 20\" x 3 each   Stretch Other 1           Quads 20\"  Stretch Other 2           DKTC at rail with noodle support  Vertical Traction          LN 2 min suspended  Abdominals                 white DB press down 12x, 10x obliques  Clams                          LN suspended 12x  Hip Abd/Add                2 x 10 LN assist  Hip Flex/Ext                 12x LN assist  March in Place            15x  Mini Squat                   sit to stand from bench glute squeeze 10x  Toe/Heel Raises         -  Uni-Squat                    SN single leg press down 10x each  Uni-Clock                    cat/camel pelvic tilts at rail 10x  Step Ups                     -  Bicycle                         1 min LN suspended   Flutter/Scissor             15/15x LN suspended   Exercise 1                   aqualogics push/pull 20x; aqualogics stirs 10/10x cw/ccw  Exercise 2                   15x LN suspended tuckups   Exercise 3                   lunge position short trunk rotation arms extended 10x each    Assessment/Plan  pain decreased to 2/10 after skilled intervention. discussed importance of " appropriate stretching at home on days she has increased pain. will establish simple hep for patient to continue lumbar mobility and core strengthening safely when not in the pool. increased resistance with all core stability therex today. continues to have low back pain relief with dissociative hip movements while keeping neutral spine. Pt issued written land HEP including child's pose, laterally oriented child's pose, and cat/camel. She reported no pain with modified versions of these in pool.    Progress per Plan of Care and Progress strengthening /stabilization /functional activity               Timed:  Aquatic Therapy    30     mins [id number];    Brannon Sandoval, PT DPT  Physical Therapist

## 2019-12-02 ENCOUNTER — TREATMENT (OUTPATIENT)
Dept: PHYSICAL THERAPY | Facility: CLINIC | Age: 59
End: 2019-12-02

## 2019-12-02 DIAGNOSIS — R26.89 DECREASED MOBILITY: ICD-10-CM

## 2019-12-02 DIAGNOSIS — M54.50 CHRONIC RIGHT-SIDED LOW BACK PAIN WITHOUT SCIATICA: Primary | ICD-10-CM

## 2019-12-02 DIAGNOSIS — G89.29 CHRONIC RIGHT-SIDED LOW BACK PAIN WITHOUT SCIATICA: Primary | ICD-10-CM

## 2019-12-02 DIAGNOSIS — M54.6 CHRONIC RIGHT-SIDED THORACIC BACK PAIN: ICD-10-CM

## 2019-12-02 DIAGNOSIS — G89.29 CHRONIC RIGHT-SIDED THORACIC BACK PAIN: ICD-10-CM

## 2019-12-02 PROCEDURE — 97113 AQUATIC THERAPY/EXERCISES: CPT | Performed by: PHYSICAL THERAPIST

## 2019-12-02 NOTE — PROGRESS NOTES
"Physical Therapy Daily Progress Note/30 day recert    Patient: aSrah Borges   : 1960  Diagnosis/ICD-10 Code:  Chronic right-sided low back pain without sciatica [M54.5, G89.29]  Referring practitioner: Ricardo Skelton*  Date of Initial Visit:   Type: THERAPY  Noted: 10/1/2019  Today's Date: 2019  Patient seen for 13 sessions             Subjective   Doing better overall. 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 able to sit or stand prn which helps a lot. still having trouble getting things off the floor but compliant with golfers lift and/or reacher as needed. follow with MD . no longer on pain meds and sleeping ok most nights    Objective     AQUATICS LE  Water Walk                 forward, sideways 2 laps each   Stretch  1                     HS LN behind knee 1m with LAQ nerve glide  Stretch 2                      Calf 20\" x 2 each. Held today  Stretch 3                      Piriformis 20\" x 3 each .next time with SN under foot  Stretch Other 1           Quads 30s with SN  Stretch Other 2           DKTC at rail with noodle support. 1m. Gentle knee ext  Vertical Traction          LN 2 min suspended  Abdominals                 LN press down 10x, 10x obliques rotate only to 1, 11 o'clock  Clams                          LN suspended behind knee, 10x easy hip circles  Hip Abd/Add                  Hip Flex/Ext                 10x LN assist flex only  March in Place            15x LN assist  Mini Squat                   LN assist 10 deep water  Toe/Heel Raises         LN assist 10   Uni-Squat                    SN single leg press down 10x each  Uni-Clock                    cat/camel pelvic tilts at rail 10x. Held today  Step Ups                     -  Bicycle                         2 min LN suspended   Flutter/Scissor             15/15x LN suspended . Held today  Exercise 1                   black aqualogics push/pull 20x; aqualogics ab add 10/10x cw/ccw  Exercise " 2                   15x LN suspended tuckups. Held today  Exercise 3                        PT Short Term Goals      STG 1  Patient will be independent with initial HEP.  -GR  12-2-19     STG 1 Progress  Met  -GR       STG 2  Patient will maintain proper body mechanics for transfers and light object lifting for spine preservation.  -GR       STG 2 Progress  Partially Met  -GR  Met ZK     STG 2 Progress Comments  states she is modifying activity such as laundry; educated on proper mechanics on 10/29  -GR                  Long Term Goals     LTG 1  Patient will be independent with progressive HEP for long term management of current condition.  -GR  providing with water HEP. Pt doing a few land HEP with primary plan to walk on her treadmill for ex     LTG 1 Progress  Ongoing  -GR  partially met     LTG 2  25% improvement in lumbar AROM all planes to ease transfers.  -GR       LTG 2 Progress  Ongoing  -GR  partially met     LTG 2 Progress Comments  flexion is worse; extension is better; all others unchanged  -GR  flexion improving but still cautious     LTG 3  Patient will score </=26% disability on the modified CARLENE to indicate improved perceived positional tolerance.  -GR       LTG 3 Progress  Ongoing  -GR  ongoing     LTG 3 Progress Comments  38%  -GR  will test at dc     LTG 4  Patient will demonstrate sit<>stand without facial grimacing to ease community reintegration.  -GR       LTG 4 Progress  Ongoing  -GR  ongoing     LTG 4 Progress Comments  approx 3/10 on average  -GR  can struggle from low chairs but pt is taller than average                   Assessment/Plan  Modified some ex to reduce rotation component due to some post PT soreness last rx. Works hard and should do well with light home ex and TM walking. No real plans for ongoing water ex but will provide with HEP nevertheless.     Progress per Plan of Care and Progress strengthening /stabilization /functional activity               Timed:  Aquatic Therapy     30     mins [id number];    Zorre Zeno Kimura, PT  Physical Therapist

## 2019-12-04 ENCOUNTER — TREATMENT (OUTPATIENT)
Dept: PHYSICAL THERAPY | Facility: CLINIC | Age: 59
End: 2019-12-04

## 2019-12-04 DIAGNOSIS — G89.29 CHRONIC RIGHT-SIDED LOW BACK PAIN WITHOUT SCIATICA: Primary | ICD-10-CM

## 2019-12-04 DIAGNOSIS — M54.50 CHRONIC RIGHT-SIDED LOW BACK PAIN WITHOUT SCIATICA: Primary | ICD-10-CM

## 2019-12-04 DIAGNOSIS — R26.89 DECREASED MOBILITY: ICD-10-CM

## 2019-12-04 PROCEDURE — 97113 AQUATIC THERAPY/EXERCISES: CPT | Performed by: PHYSICAL THERAPIST

## 2019-12-04 NOTE — PROGRESS NOTES
"Physical Therapy Daily Progress Note    Patient: Sarah Borges   : 1960  Diagnosis/ICD-10 Code:  Chronic right-sided low back pain without sciatica [M54.5, G89.29]  Referring practitioner: Ricardo Skelton*  Date of Initial Visit:   Type: THERAPY  Noted: 10/1/2019  Today's Date: 2019  Patient seen for 14 sessions             Subjective   bothered by leg cramps last night and open to trying epsom salt soaks and even cytomegalovirus powder     Objective     AQUATICS LE    Water Walk                 forward, sideways held today  Stretch  1                     HS SN behind knee 1m with LAQ nerve glide  Stretch 2                      Calf 20\" x 2 each. Held today  Stretch 3                      Piriformis 1m with SN under foot  Stretch Other 1           Quads 30s with SN. Held today  Stretch Other 2           SKTC and DKTC at rail with noodle support. 1m.  Vertical Traction          LN + SN  2 min suspended  Abdominals                 SN press down 10x, 10x obliques rotate only to 1, 11 o'clock. held  Clams                          LN suspended behind knee, 10x easy hip ovals  Hip Abd/Add                  Hip Flex/Ext                 10 flex only  March in Place            15x   Mini Squat                   LN assist 10 deep water  Toe/Heel Raises         LN assist 10   Uni-Squat                    SN single leg press down 15x each  Uni-Clock                    cat/camel pelvic tilts at rail 10x. Held today  Step Ups                     -  Bicycle                         2 min LN suspended   Flutter/Scissor             15/15x LN suspended . Held today  Exercise 1                   black aqualogics push/pull 20x; aqualogics ab add 10/10x cw/ccw. Held today  Exercise 2                   15x LN suspended tuckups. Held today  Exercise 3                          Assessment/Plan  Slow moving program today with lighter resistance, more unloading and decompression due to more fatigue and LE cramping last " night. See how lighter program feels post rx    Progress per Plan of Care and Progress strengthening /stabilization /functional activity               Timed:  Aquatic Therapy    30     mins [id number];    Zorre Zeno Kimura, PT  Physical Therapist

## 2019-12-05 ENCOUNTER — OFFICE VISIT (OUTPATIENT)
Dept: CARDIOLOGY | Facility: CLINIC | Age: 59
End: 2019-12-05

## 2019-12-05 VITALS
DIASTOLIC BLOOD PRESSURE: 70 MMHG | SYSTOLIC BLOOD PRESSURE: 130 MMHG | HEIGHT: 67 IN | BODY MASS INDEX: 28.16 KG/M2 | WEIGHT: 179.4 LBS | HEART RATE: 70 BPM

## 2019-12-05 DIAGNOSIS — I48.0 PAROXYSMAL ATRIAL FIBRILLATION (HCC): ICD-10-CM

## 2019-12-05 DIAGNOSIS — E78.2 MIXED HYPERLIPIDEMIA: Primary | ICD-10-CM

## 2019-12-05 DIAGNOSIS — I87.2 CHRONIC VENOUS INSUFFICIENCY: ICD-10-CM

## 2019-12-05 DIAGNOSIS — R60.0 LOWER EXTREMITY EDEMA: ICD-10-CM

## 2019-12-05 DIAGNOSIS — Z82.49 FAMILY HISTORY OF PREMATURE CORONARY ARTERY DISEASE: ICD-10-CM

## 2019-12-05 PROCEDURE — 93000 ELECTROCARDIOGRAM COMPLETE: CPT | Performed by: NURSE PRACTITIONER

## 2019-12-05 PROCEDURE — 99214 OFFICE O/P EST MOD 30 MIN: CPT | Performed by: NURSE PRACTITIONER

## 2019-12-05 RX ORDER — ATORVASTATIN CALCIUM 40 MG/1
80 TABLET, FILM COATED ORAL DAILY
Qty: 90 TABLET | Refills: 0 | Status: SHIPPED | OUTPATIENT
Start: 2019-12-05 | End: 2019-12-06 | Stop reason: SDUPTHER

## 2019-12-05 NOTE — PROGRESS NOTES
Date of Office Visit: 2019  Encounter Provider: ANA Walter  Place of Service: [facility] CARDIOLOGY  Patient Name: Sarah Borges  :1960  Primary Cardiologist: Dr. Feliciano Cordova    Chief Complaint   Patient presents with   • Follow-up   :     Dear Dr. Araujo,     HPI: Sarah Borges is a pleasant 59 y.o. female who presents today for annual cardiac follow up.     She has a sister who had premature coronary artery disease and a large anterior infarct.  The patient's been diagnosed with familial hyperlipidemia and she has remained on statin therapy.  In 2016, the patient had a CT calcium score which was completely normal with a score of 0.  She has been diagnosed with chronic venous insufficiency and lower extremity edema.      In 2017, she was evaluated by Dr. Feliciano Cordova and was doing well at that time.  She was recommended to follow-up in 2 years.    In 2019, she followed up with me and was doing well.  She reported the chronic lower extremity edema and felt that her varicosities had worsened.    On 2019 she had surgery for port placement on the right side of her neck.  She reported that they thought that they poked her heart with the end of the catheter would put her into atrial fibrillation for 17 minutes with a heart rate of 140.  When they pulled back on the catheter she went back into a normal sinus rhythm.  Dr. Cordova was made aware of this brief episode of paroxysmal atrial fibrillation due to guidewire manipulation.  She did not think that any further testing or anticoagulation was warranted.  She was to follow-up with me in 1 month and the patient canceled that appointment.    She presents today for follow-up.  She has chronic lower extremity edema and reports some mild dizziness in which she feels that she may have vertigo intermittently.  She denies chest pain, dyspnea, PND, orthopnea, cough, edema, palpitations, or syncope.   Her blood pressure and heart rate are both normal today.    I reviewed her lipid panel from 10/1/2019 which showed total cholesterol 246, triglycerides 88, HDL 58, and .  She was going to follow-up with me in October for further discussion of her cholesterol panel.    Past Medical History:   Diagnosis Date   • Agatston coronary artery calcium score less than 100     score was zero, 2/2016   • Cervical neuritis     C4-C5 of the left   • Chronic venous insufficiency    • Hyperlipidemia    • Lateral epicondylitis of right elbow    • Lumbar radiculopathy    • Neck pain        Past Surgical History:   Procedure Laterality Date   • HYSTERECTOMY     • NECK SURGERY     • VEIN LIGATION AND STRIPPING         Social History     Socioeconomic History   • Marital status:      Spouse name: Not on file   • Number of children: Not on file   • Years of education: Not on file   • Highest education level: Not on file   Tobacco Use   • Smoking status: Former Smoker   • Smokeless tobacco: Never Used   Substance and Sexual Activity   • Alcohol use: Yes     Comment: Caffeine use   • Drug use: No   • Sexual activity: Defer       Family History   Problem Relation Age of Onset   • Hypertension Mother    • Heart attack Mother         At an advanced age.   • Migraines Sister    • Heart attack Sister         At a young age.   • Cancer Maternal Grandmother         Colon       The following portion of the patient's history were reviewed and updated as appropriate: past medical history, past surgical history, past social history, past family history, allergies, current medications, and problem list.    Review of Systems   Constitution: Negative for chills, diaphoresis, fever, malaise/fatigue, night sweats, weight gain and weight loss.   HENT: Negative for hearing loss, nosebleeds, sore throat and tinnitus.    Eyes: Negative for blurred vision, double vision, pain and visual disturbance.   Cardiovascular: Positive for leg swelling.  "Negative for chest pain, claudication, cyanosis, dyspnea on exertion, irregular heartbeat, near-syncope, orthopnea, palpitations, paroxysmal nocturnal dyspnea and syncope.   Respiratory: Negative for cough, hemoptysis, shortness of breath, snoring and wheezing.    Endocrine: Negative for cold intolerance, heat intolerance and polyuria.   Hematologic/Lymphatic: Negative for bleeding problem. Does not bruise/bleed easily.   Skin: Negative for color change, dry skin, flushing and itching.   Musculoskeletal: Negative for falls, joint pain, joint swelling, muscle cramps, muscle weakness and myalgias.   Gastrointestinal: Negative for abdominal pain, constipation, heartburn, melena, nausea and vomiting.   Genitourinary: Negative for dysuria and hematuria.   Neurological: Positive for dizziness. Negative for excessive daytime sleepiness, light-headedness, loss of balance, numbness, paresthesias, seizures and vertigo.   Psychiatric/Behavioral: Negative for altered mental status, depression, memory loss and substance abuse. The patient does not have insomnia and is not nervous/anxious.    Allergic/Immunologic: Negative for environmental allergies.       No Known Allergies      Current Outpatient Medications:   •  atorvastatin (LIPITOR) 40 MG tablet, Take 2 tablets by mouth Daily., Disp: 90 tablet, Rfl: 0  •  ferrous sulfate 325 (65 FE) MG tablet, Take 1 tablet by mouth daily with breakfast., Disp: 60 tablet, Rfl: 0  •  ibuprofen (ADVIL,MOTRIN) 200 MG tablet, Take 200 mg by mouth Every 6 (Six) Hours As Needed for mild pain (1-3)., Disp: , Rfl:   •  vitamin B-12 (CYANOCOBALAMIN) 1000 MCG tablet, Take 1,000 mcg by mouth Daily., Disp: , Rfl:         Objective:     Vitals:    12/05/19 1537 12/05/19 1543   BP: 130/70 130/70   BP Location: Left arm Right arm   Pulse: 70    Weight: 81.4 kg (179 lb 6.4 oz)    Height: 170.2 cm (67\")      Body mass index is 28.1 kg/m².    PHYSICAL EXAM:    Vitals Reviewed.   General Appearance: No acute " distress, well developed and well nourished.   Eyes: Conjunctiva and lids: No erythema, swelling, or discharge. Sclera non-icteric.   HENT: Atraumatic, normocephalic. External eyes, ears, and nose normal. No hearing loss noted. Mucous membranes normal. Lips not cyanotic. Neck supple with no tenderness.  Respiratory: No signs of respiratory distress. Respiration rhythm and depth normal.   Clear to auscultation. No rales, crackles, rhonchi, or wheezing auscultated.   Cardiovascular:  Jugular Venous Pressure: Normal  Heart Rate and Rhythm: Normal, Heart Sounds: Normal S1 and S2. No S3 or S4 noted.  Murmurs: No murmurs noted. No rubs, thrills, or gallops.   Lower Extremities: Trace lower extremity edema noted.  Gastrointestinal:  Abdomen soft, non-distended, non-tender. Normal bowel sounds. No hepatomegaly.   Musculoskeletal: Normal movement of extremities  Skin and Nails: General appearance normal. No pallor, cyanosis, diaphoresis. Skin temperature normal. No clubbing of fingernails.   Psychiatric: Patient alert and oriented to person, place, and time. Speech and behavior appropriate. Normal mood and affect.       ECG 12 Lead  Date/Time: 12/5/2019 3:39 PM  Performed by: Magali Mendez APRN  Authorized by: Magali Mendez APRN   Comparison: compared with previous ECG from 6/12/2019  Similar to previous ECG  Rhythm: sinus rhythm  Rate: normal  BPM: 70  Q waves: V1, V2 and V3    ST Segments: ST segments normal  T Waves: T waves normal  QRS axis: normal  Other: no other findings    Clinical impression: non-specific ECG  Comments: EKG unchanged              Assessment:       Diagnosis Plan   1. Mixed hyperlipidemia  Comprehensive Metabolic Panel    Lipid Panel   2. Family history of premature coronary artery disease     3. Chronic venous insufficiency     4. Lower extremity edema     5. Paroxysmal atrial fibrillation (CMS/HCC)            Plan:       1.  Familial Hyperlipidemia: I discussed her plan of care with   Art.  Guidelines for familial hyperlipidemia/hypercholesterolemia is an LDL less than 100. We recommended increasing her atorvastatin to 80 mg daily and repeating a lipid panel in 3 months.      2.  Family History of Premature CAD: We discussed risk factor modification.  She had a CT calcium score of 0 in 2016 and Dr. Cordova has recommended that be repeated in 5-10 years.    3/4.  Chronic Venous insufficiency and Lower Extremity Edema: She has bilateral trace lower extremity edema and varicosities noted.  I recommended elevation of her legs, compression stockings, and low-sodium diet.    5.  Brief run of atrial fibrillation: She had a brief episode of atrial fibrillation with catheter manipulation around the heart.  No documented recurrence of atrial fibrillation.    6.  She will follow-up with Dr. Feliciano Cordova in 1 year, unless otherwise needed sooner.    As always, it has been a pleasure to participate in your patient's care. Thank you.       Sincerely,         ANA Pérez        **Dragon Disclaimer:**  Much of this encounter note is an electronic transcription/translation of spoken language to printed text. The electronic translation of spoken language may permit erroneous, or at times, nonsensical words or phrases to be inadvertently transcribed. Although I have reviewed the note for such errors, some may still exist.

## 2019-12-06 PROBLEM — R60.0 LOWER EXTREMITY EDEMA: Status: RESOLVED | Noted: 2019-06-12 | Resolved: 2019-12-06

## 2019-12-06 RX ORDER — ATORVASTATIN CALCIUM 80 MG/1
80 TABLET, FILM COATED ORAL DAILY
Qty: 90 TABLET | Refills: 1 | Status: SHIPPED | OUTPATIENT
Start: 2019-12-06 | End: 2020-03-31 | Stop reason: SDUPTHER

## 2019-12-09 ENCOUNTER — TREATMENT (OUTPATIENT)
Dept: PHYSICAL THERAPY | Facility: CLINIC | Age: 59
End: 2019-12-09

## 2019-12-09 DIAGNOSIS — G89.29 CHRONIC RIGHT-SIDED THORACIC BACK PAIN: ICD-10-CM

## 2019-12-09 DIAGNOSIS — R26.89 DECREASED MOBILITY: ICD-10-CM

## 2019-12-09 DIAGNOSIS — M54.6 CHRONIC RIGHT-SIDED THORACIC BACK PAIN: ICD-10-CM

## 2019-12-09 DIAGNOSIS — M54.50 CHRONIC RIGHT-SIDED LOW BACK PAIN WITHOUT SCIATICA: Primary | ICD-10-CM

## 2019-12-09 DIAGNOSIS — G89.29 CHRONIC RIGHT-SIDED LOW BACK PAIN WITHOUT SCIATICA: Primary | ICD-10-CM

## 2019-12-09 PROCEDURE — 97113 AQUATIC THERAPY/EXERCISES: CPT | Performed by: PHYSICAL THERAPIST

## 2019-12-09 NOTE — PROGRESS NOTES
"Physical Therapy Daily Progress Note    Patient: Sarah Borges   : 1960  Diagnosis/ICD-10 Code:  Chronic right-sided low back pain without sciatica [M54.5, G89.29]  Referring practitioner: Ricardo Skelton*  Date of Initial Visit: Type: THERAPY  Noted: 10/1/2019  Today's Date: 2019  Patient seen for 15 sessions             Subjective Evaluation    History of Present Illness    Subjective comment: did pretty well over the weekend. cholesterol very high per MD and can't ex too intensely so really wants to change her diet before monthly injection option         Objective     AQUATICS LE    Water Walk                 front high stepping march walks 3m  Stretch  1                     HS SN behind knee 1m with ankle pump nerve glide  Stretch 2                      Calf 20\" x 2 each.   Stretch 3                      Piriformis 1m with SN under foot  Stretch Other 1           Quads 30s with SN.   Stretch Other 2           DKTC at rail with noodle support. 1m.  Vertical Traction          LN   2 min suspended  Abdominals                 LN press down 10x, 10x obliques rotate only to 1, 11 o'clock.   Clams                          LN suspended behind knee, 10x easy hip ovals  Hip Abd/Add                  Hip Flex/Ext                 10 flex only  March in Place            15x   Mini Squat                   15 deep water with SN between legs for extra bouyancy  Toe/Heel Raises          10   Uni-Squat                    LN single leg press down 15x each  Uni-Clock                    cat/camel pelvic tilts at rail 10x. Held today  Step Ups                     -  Bicycle                         2 min LN held today  Flutter/Scissor             15/15x LN suspended . Held today  Exercise 1                   black aqualogics push/pull 20x; aqualogics ab add 10/10x cw/ccw.  Held today  Exercise 2                   single arm sweeps for core with surface and deeper into water RROM 3m  Exercise " 3                       Assessment & Plan     Assessment  Assessment details: Went over her HEP today and pt 90% capable with teach back. Will laminate her program and we'll wrap up this episode of care later this week.           Progress per Plan of Care and Anticipate DC next Visit           Timed:  Aquatic Therapy    30     mins [id number];    Zorre Zeno Kimura, PT  Physical Therapist

## 2019-12-11 ENCOUNTER — TREATMENT (OUTPATIENT)
Dept: PHYSICAL THERAPY | Facility: CLINIC | Age: 59
End: 2019-12-11

## 2019-12-11 DIAGNOSIS — G89.29 CHRONIC RIGHT-SIDED THORACIC BACK PAIN: ICD-10-CM

## 2019-12-11 DIAGNOSIS — M54.6 CHRONIC RIGHT-SIDED THORACIC BACK PAIN: ICD-10-CM

## 2019-12-11 DIAGNOSIS — R26.89 DECREASED MOBILITY: ICD-10-CM

## 2019-12-11 DIAGNOSIS — G89.29 CHRONIC RIGHT-SIDED LOW BACK PAIN WITHOUT SCIATICA: Primary | ICD-10-CM

## 2019-12-11 DIAGNOSIS — M54.50 CHRONIC RIGHT-SIDED LOW BACK PAIN WITHOUT SCIATICA: Primary | ICD-10-CM

## 2019-12-11 PROCEDURE — 97113 AQUATIC THERAPY/EXERCISES: CPT | Performed by: PHYSICAL THERAPIST

## 2019-12-11 NOTE — PROGRESS NOTES
"Physical Therapy Daily Progress Note/Discharge    Patient: Sarah Borges   : 1960  Diagnosis/ICD-10 Code:  Chronic right-sided low back pain without sciatica [M54.5, G89.29]  Referring practitioner: Ricardo Skelton*  Date of Initial Visit: Type: THERAPY  Noted: 10/1/2019  Today's Date: 2019  Patient seen for 16 sessions             Subjective Evaluation    History of Present Illness    Subjective comment: ready to dc to [facility] where she will do some hamstring stretching and walking. feels much better since going thru aquatic PT. 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 feels ready          Objective     AQUATICS LE    Water Walk                 front high stepping march walks 3m  Stretch  1                     HS LN behind knee 1m with ankle pump nerve glide  Stretch 2                      Calf 20\" x 2 each.   Stretch 3                      Piriformis 1m with SN under foot  Stretch Other 1           Quads 30s with SN. Held today  Stretch Other 2           DKTC at rail with noodle support. 1m.  Vertical Traction          LN   2 min suspended  Abdominals                 LN press down 10x, no obliques today  Clams                          LN suspended behind knee, 10x easy hip ovals  Hip Abd/Add                  Hip Flex/Ext                 10 flex only  March in Place            15x   Mini Squat                   15 deep water with SN between legs for extra bouyancy  Toe/Heel Raises          10   Uni-Squat                    LN single leg press down 15x each  Uni-Clock                    cat/camel pelvic tilts at rail 10x. Held today  Step Ups                     -  Bicycle                         2 min LN   Flutter/Scissor             15/15x LN suspended . Held today  Exercise 1                   black aqualogics push/pull 20x; aqualogics ab add 10/10x cw/ccw.  Held today  Exercise 2                   single " arm sweeps for core with surface and deeper into water RROM 3m  Exercise 3                          Assessment & Plan     Assessment  Assessment details: Pt did well with land then aquatic therapy. Has HEP for both. Still tight hamstrings and limited spinal motion but overall improved. No change in CARLENE but this most likely due to challenge in answering intake questions vs. Dc questions done with assist from PT.       PT Short Term Goals      STG 1  Patient will be independent with initial HEP.  -GR  12-11-19     STG 1 Progress  Met  -GR  met ZK     STG 2  Patient will maintain proper body mechanics for transfers and light object lifting for spine preservation.  -GR       STG 2 Progress  Partially Met  -GR  met ZK     STG 2 Progress Comments  states she is modifying activity such as laundry; educated on proper mechanics on 10/29  -GR                  Long Term Goals     LTG 1  Patient will be independent with progressive HEP for long term management of current condition.  -GR       LTG 1 Progress  Ongoing  -GR  Met ZK     LTG 2  25% improvement in lumbar AROM all planes to ease transfers.  -GR       LTG 2 Progress  Ongoing  -GR  Met ZK     LTG 2 Progress Comments  flexion is worse; extension is better; all others unchanged  -GR  gentle improvement in all planes     LTG 3  Patient will score </=26% disability on the modified CARLENE to indicate improved perceived positional tolerance.  -GR       LTG 3 Progress  Ongoing  -GR  not met ZK     LTG 3 Progress Comments  38%  -GR  still at 38% but pain is improving and only mild and varies vs. constant     LTG 4  Patient will demonstrate sit<>stand without facial grimacing to ease community reintegration.  -GR       LTG 4 Progress  Ongoing  -GR  met ZK     LTG 4 Progress Comments  approx 3/10 on average  -GR             Other DC after 18 sessions this episode of care           Timed:  Aquatic Therapy    30     mins [id number];    Zorre Zeno Kimura, PT  Physical Therapist

## 2019-12-27 ENCOUNTER — TELEPHONE (OUTPATIENT)
Dept: CARDIOLOGY | Facility: CLINIC | Age: 59
End: 2019-12-27

## 2019-12-27 NOTE — TELEPHONE ENCOUNTER
Pt called and states that she would like to re discuss her lipid lowering options again.  She would like to consider the injectables.  Pt will be checking with her insurance for the Praluent and Repatha and will await a call on Mon or next week.

## 2019-12-31 NOTE — TELEPHONE ENCOUNTER
At TK's last visit, the atorvastatin was increased to 80mg daily.  Was she able to tolerate that dose?  If so, we would need a repeat lipid panel ~ 6 weeks after the dose was increased.    Has she heard back re: the cost on her plan for either medication? Praluent would be 75mg injected every two weeks; Repatha would be 140mg injected every two weeks.      AMANDO

## 2019-12-31 NOTE — TELEPHONE ENCOUNTER
Called, but left a vm on her cell phone. I have attempted to try the home number, but it doesn't ring?

## 2020-01-28 ENCOUNTER — PRIOR AUTHORIZATION (OUTPATIENT)
Dept: CARDIOLOGY | Facility: CLINIC | Age: 60
End: 2020-01-28

## 2020-01-28 NOTE — TELEPHONE ENCOUNTER
Consulted with Dr. Cordova verbally regarding Repatha dose.  She confirmed to start pt on Repatha 140 mg q 14 days.  I called pt to inform her of the medication and if she has any issues, whether it be obtaining the medication or instructions on how to administer to contact the office.  I also informed her that the pharmacist should be able to assist with administering as well.          Initiated PA for Repatha 140 mg          .

## 2020-01-30 NOTE — TELEPHONE ENCOUNTER
PA has been approved 01/28/20 - 01/27/2021     Pt was instructed by her pharmacy that she needed to also be on an oral chol med along with the Repatha.  Pt was unable to tolerate the Atorvastatin due to  LE pain and swelling.  Do you want pt on an oral chol med?

## 2020-03-31 ENCOUNTER — TELEPHONE (OUTPATIENT)
Dept: CARDIOLOGY | Facility: CLINIC | Age: 60
End: 2020-03-31

## 2020-03-31 ENCOUNTER — LAB (OUTPATIENT)
Dept: LAB | Facility: HOSPITAL | Age: 60
End: 2020-03-31

## 2020-03-31 DIAGNOSIS — E78.2 MIXED HYPERLIPIDEMIA: ICD-10-CM

## 2020-03-31 LAB
ALBUMIN SERPL-MCNC: 4 G/DL (ref 3.5–5.2)
ALBUMIN/GLOB SERPL: 1.4 G/DL
ALP SERPL-CCNC: 95 U/L (ref 39–117)
ALT SERPL W P-5'-P-CCNC: 17 U/L (ref 1–33)
ANION GAP SERPL CALCULATED.3IONS-SCNC: 10.2 MMOL/L (ref 5–15)
AST SERPL-CCNC: 18 U/L (ref 1–32)
BILIRUB SERPL-MCNC: 0.5 MG/DL (ref 0.2–1.2)
BUN BLD-MCNC: 17 MG/DL (ref 6–20)
BUN/CREAT SERPL: 27 (ref 7–25)
CALCIUM SPEC-SCNC: 9.7 MG/DL (ref 8.6–10.5)
CHLORIDE SERPL-SCNC: 103 MMOL/L (ref 98–107)
CHOLEST SERPL-MCNC: 200 MG/DL (ref 0–200)
CO2 SERPL-SCNC: 29.8 MMOL/L (ref 22–29)
CREAT BLD-MCNC: 0.63 MG/DL (ref 0.57–1)
GFR SERPL CREATININE-BSD FRML MDRD: 97 ML/MIN/1.73
GLOBULIN UR ELPH-MCNC: 2.9 GM/DL
GLUCOSE BLD-MCNC: 89 MG/DL (ref 65–99)
HDLC SERPL-MCNC: 52 MG/DL (ref 40–60)
LDLC SERPL CALC-MCNC: 131 MG/DL (ref 0–100)
LDLC/HDLC SERPL: 2.52 {RATIO}
POTASSIUM BLD-SCNC: 4.4 MMOL/L (ref 3.5–5.2)
PROT SERPL-MCNC: 6.9 G/DL (ref 6–8.5)
SODIUM BLD-SCNC: 143 MMOL/L (ref 136–145)
TRIGL SERPL-MCNC: 86 MG/DL (ref 0–150)
VLDLC SERPL-MCNC: 17.2 MG/DL

## 2020-03-31 PROCEDURE — 80061 LIPID PANEL: CPT

## 2020-03-31 PROCEDURE — 80053 COMPREHEN METABOLIC PANEL: CPT

## 2020-03-31 PROCEDURE — 36415 COLL VENOUS BLD VENIPUNCTURE: CPT

## 2020-03-31 RX ORDER — ATORVASTATIN CALCIUM 40 MG/1
80 TABLET, FILM COATED ORAL DAILY
Qty: 30 TABLET | Refills: 4 | Status: SHIPPED | OUTPATIENT
Start: 2020-03-31 | End: 2020-04-01 | Stop reason: SDUPTHER

## 2020-03-31 NOTE — TELEPHONE ENCOUNTER
I spoke with Mrs. Borges via telephone.  I reviewed with her the recent blood work and she verbalizes understanding.  Upon review of her medications, she is not taking atorvastatin like I thought she was.    In the past she was able to tolerate atorvastatin 40 mg daily, but was unable to tolerate the 80 mg dosage.    Since Repatha works nicely with statin therapy, I have recommended restarting the atorvastatin 40 mg daily.  She will continue with her current biweekly doses of Repatha.  We will recheck her cholesterol panel in 3 months.  She is also following weight watchers.  If her LDL is still not less than 100 then we will go to Repatha 420 mg subcu monthly.    She verbalized understanding.

## 2020-04-01 RX ORDER — ATORVASTATIN CALCIUM 40 MG/1
40 TABLET, FILM COATED ORAL DAILY
Qty: 30 TABLET | Refills: 4 | Status: SHIPPED | OUTPATIENT
Start: 2020-04-01 | End: 2020-12-03 | Stop reason: SDUPTHER

## 2020-07-01 ENCOUNTER — TELEPHONE (OUTPATIENT)
Dept: CARDIOLOGY | Facility: CLINIC | Age: 60
End: 2020-07-01

## 2020-07-01 DIAGNOSIS — E78.2 MIXED HYPERLIPIDEMIA: Primary | ICD-10-CM

## 2020-07-01 NOTE — TELEPHONE ENCOUNTER
----- Message from ANA Juarez sent at 3/31/2020  1:48 PM EDT -----    Recheck lipid panel in 3 months (see telephone 3/31)

## 2020-07-02 NOTE — TELEPHONE ENCOUNTER
On 3/31/2020 she was restarted on atorvastatin 40 mg daily in addition to her Repatha.  She is due for repeat fasting lipid panel and liver enzymes.  Orders have been placed.    Isabel-please call her and ask her to have her fasting blood work completed.  Thank you

## 2020-07-08 ENCOUNTER — LAB (OUTPATIENT)
Dept: LAB | Facility: HOSPITAL | Age: 60
End: 2020-07-08

## 2020-07-08 DIAGNOSIS — E78.2 MIXED HYPERLIPIDEMIA: ICD-10-CM

## 2020-07-08 LAB
ALT SERPL W P-5'-P-CCNC: 16 U/L (ref 1–33)
AST SERPL-CCNC: 21 U/L (ref 1–32)
CHOLEST SERPL-MCNC: 137 MG/DL (ref 0–200)
HDLC SERPL-MCNC: 58 MG/DL (ref 40–60)
LDLC SERPL CALC-MCNC: 69 MG/DL (ref 0–100)
LDLC/HDLC SERPL: 1.2 {RATIO}
TRIGL SERPL-MCNC: 48 MG/DL (ref 0–150)
VLDLC SERPL-MCNC: 9.6 MG/DL (ref 5–40)

## 2020-07-08 PROCEDURE — 84450 TRANSFERASE (AST) (SGOT): CPT

## 2020-07-08 PROCEDURE — 80061 LIPID PANEL: CPT

## 2020-07-08 PROCEDURE — 84460 ALANINE AMINO (ALT) (SGPT): CPT

## 2020-07-08 PROCEDURE — 36415 COLL VENOUS BLD VENIPUNCTURE: CPT

## 2020-12-03 RX ORDER — ATORVASTATIN CALCIUM 40 MG/1
40 TABLET, FILM COATED ORAL DAILY
Qty: 90 TABLET | Refills: 3 | Status: SHIPPED | OUTPATIENT
Start: 2020-12-03 | End: 2021-03-30 | Stop reason: SDUPTHER

## 2020-12-23 ENCOUNTER — TELEPHONE (OUTPATIENT)
Dept: OBSTETRICS AND GYNECOLOGY | Facility: CLINIC | Age: 60
End: 2020-12-23

## 2020-12-23 NOTE — TELEPHONE ENCOUNTER
Ms. Borges is a patient of Dr. Gill. Should would like to schedule her annual with a female provider. Are you able to take on her care?    Thanks,    Gladys

## 2021-01-19 ENCOUNTER — OFFICE VISIT (OUTPATIENT)
Dept: OBSTETRICS AND GYNECOLOGY | Facility: CLINIC | Age: 61
End: 2021-01-19

## 2021-01-19 ENCOUNTER — APPOINTMENT (OUTPATIENT)
Dept: WOMENS IMAGING | Facility: HOSPITAL | Age: 61
End: 2021-01-19

## 2021-01-19 ENCOUNTER — PROCEDURE VISIT (OUTPATIENT)
Dept: OBSTETRICS AND GYNECOLOGY | Facility: CLINIC | Age: 61
End: 2021-01-19

## 2021-01-19 VITALS
WEIGHT: 173 LBS | SYSTOLIC BLOOD PRESSURE: 110 MMHG | DIASTOLIC BLOOD PRESSURE: 80 MMHG | BODY MASS INDEX: 27.15 KG/M2 | HEIGHT: 67 IN

## 2021-01-19 DIAGNOSIS — R68.82 DECREASED LIBIDO: ICD-10-CM

## 2021-01-19 DIAGNOSIS — Z12.31 VISIT FOR SCREENING MAMMOGRAM: Primary | ICD-10-CM

## 2021-01-19 DIAGNOSIS — Z01.419 WOMEN'S ANNUAL ROUTINE GYNECOLOGICAL EXAMINATION: Primary | ICD-10-CM

## 2021-01-19 DIAGNOSIS — Z78.0 POSTMENOPAUSE: ICD-10-CM

## 2021-01-19 DIAGNOSIS — N89.8 VAGINAL DRYNESS: ICD-10-CM

## 2021-01-19 PROCEDURE — 77067 SCR MAMMO BI INCL CAD: CPT | Performed by: RADIOLOGY

## 2021-01-19 PROCEDURE — 77067 SCR MAMMO BI INCL CAD: CPT | Performed by: NURSE PRACTITIONER

## 2021-01-19 PROCEDURE — 77063 BREAST TOMOSYNTHESIS BI: CPT | Performed by: RADIOLOGY

## 2021-01-19 PROCEDURE — 99396 PREV VISIT EST AGE 40-64: CPT | Performed by: NURSE PRACTITIONER

## 2021-01-19 PROCEDURE — 77063 BREAST TOMOSYNTHESIS BI: CPT | Performed by: NURSE PRACTITIONER

## 2021-01-19 PROCEDURE — 99213 OFFICE O/P EST LOW 20 MIN: CPT | Performed by: NURSE PRACTITIONER

## 2021-01-19 RX ORDER — BACLOFEN 10 MG/1
TABLET ORAL
COMMUNITY
End: 2021-01-19

## 2021-01-19 RX ORDER — ESTRADIOL 0.1 MG/G
2 CREAM VAGINAL DAILY
Qty: 42.5 G | Refills: 12 | Status: SHIPPED | OUTPATIENT
Start: 2021-01-19 | End: 2022-12-12

## 2021-01-19 RX ORDER — ONDANSETRON 4 MG/1
TABLET, FILM COATED ORAL
COMMUNITY
End: 2021-01-19

## 2021-01-19 RX ORDER — METHOCARBAMOL 750 MG/1
TABLET, FILM COATED ORAL
COMMUNITY
End: 2021-01-19

## 2021-01-19 RX ORDER — FLUOROURACIL 50 MG/G
CREAM TOPICAL
COMMUNITY
End: 2021-01-19

## 2021-01-19 RX ORDER — EVOLOCUMAB 140 MG/ML
INJECTION, SOLUTION SUBCUTANEOUS
COMMUNITY
End: 2021-01-19

## 2021-01-19 RX ORDER — HYDROCODONE BITARTRATE AND ACETAMINOPHEN 10; 325 MG/1; MG/1
TABLET ORAL
COMMUNITY
End: 2021-01-19

## 2021-01-19 RX ORDER — SULFAMETHOXAZOLE AND TRIMETHOPRIM 800; 160 MG/1; MG/1
TABLET ORAL
COMMUNITY
End: 2021-01-19

## 2021-01-19 RX ORDER — OSELTAMIVIR PHOSPHATE 75 MG/1
CAPSULE ORAL
COMMUNITY
End: 2021-01-19

## 2021-01-19 RX ORDER — FLUCONAZOLE 150 MG/1
TABLET ORAL
COMMUNITY
End: 2021-01-19

## 2021-01-19 RX ORDER — PHENDIMETRAZINE TARTRATE 105 MG/1
CAPSULE, EXTENDED RELEASE ORAL
COMMUNITY
End: 2021-01-19

## 2021-01-19 RX ORDER — PROMETHAZINE HYDROCHLORIDE 12.5 MG/1
TABLET ORAL
COMMUNITY
End: 2021-01-19

## 2021-01-19 RX ORDER — TRAMADOL HYDROCHLORIDE 50 MG/1
TABLET ORAL
COMMUNITY
End: 2021-01-19

## 2021-01-19 RX ORDER — CYCLOBENZAPRINE HCL 10 MG
TABLET ORAL
COMMUNITY
End: 2021-01-19

## 2021-01-19 RX ORDER — AMOXICILLIN AND CLAVULANATE POTASSIUM 875; 125 MG/1; MG/1
TABLET, FILM COATED ORAL
COMMUNITY
End: 2021-03-01

## 2021-01-19 RX ORDER — GABAPENTIN 100 MG/1
CAPSULE ORAL
COMMUNITY
End: 2021-01-19

## 2021-01-19 RX ORDER — HEPATITIS A VACCINE 1440 [IU]/ML
INJECTION, SUSPENSION INTRAMUSCULAR
COMMUNITY
End: 2021-01-19

## 2021-01-19 RX ORDER — PHENTERMINE HYDROCHLORIDE 37.5 MG/1
TABLET ORAL
COMMUNITY
End: 2021-01-19

## 2021-01-19 RX ORDER — METHYLPREDNISOLONE 4 MG/1
TABLET ORAL
COMMUNITY
End: 2021-01-19

## 2021-01-19 NOTE — PROGRESS NOTES
GYN Annual Exam     Chief Complaint   Patient presents with   • Gynecologic Exam     Annual exam: last pap 4/2019 negative, MG today, Dexa 2019, colonoscopy 2014.       Sarah Borges is a 60 y.o. female who presents for annual well woman exam. Periods absent since hysterectomy several years ago with ovaries remaining. She denies vaginal spotting or discharge. She does not complete SBE monthly. C/o decreased libido and vaginal dryness she is using lubricants with intercourse. This has been a problem for approx one year.    This is my first time meeting Sarah Borges  She is a pt of Dr Gill's    OB History    No obstetric history on file.         Mammogram: today  Dexa scan: 2019  Colonoscopy: 2014  Last Pap : 2019 negative  History of abnormal Pap smear: no  Family history of uterine, colon or ovarian cancer: yes - MGM colon cancer  Family history of breast cancer: maternal aunt X2  History of abnormal mammogram: yes - f/u WNL    Menopause:  Bleeding since? no  Vasomotor symptoms: yes  HRT: no  Incontinence?  no  Dyspareunia: no      Past Medical History:   Diagnosis Date   • Agatston coronary artery calcium score less than 100     score was zero, 2/2016   • Cervical neuritis     C4-C5 of the left   • Chronic venous insufficiency    • Hyperlipidemia    • Lateral epicondylitis of right elbow    • Lumbar radiculopathy    • Neck pain        Past Surgical History:   Procedure Laterality Date   • HYSTERECTOMY     • NECK SURGERY     • VEIN LIGATION AND STRIPPING           Current Outpatient Medications:   •  Alirocumab (Praluent) 75 MG/ML solution auto-injector, Inject 75 mg under the skin into the appropriate area as directed Every 14 (Fourteen) Days., Disp: 2 pen, Rfl: 6  •  atorvastatin (LIPITOR) 40 MG tablet, Take 1 tablet by mouth Daily., Disp: 90 tablet, Rfl: 3  •  ibuprofen (ADVIL,MOTRIN) 200 MG tablet, Take 200 mg by mouth Every 6 (Six) Hours As Needed for mild pain (1-3)., Disp: , Rfl:   •  vitamin B-12  "(CYANOCOBALAMIN) 1000 MCG tablet, Take 1,000 mcg by mouth Daily., Disp: , Rfl:   •  amoxicillin-clavulanate (AUGMENTIN) 875-125 MG per tablet, amoxicillin 875 mg-potassium clavulanate 125 mg tablet, Disp: , Rfl:     No Known Allergies    Social History     Tobacco Use   • Smoking status: Former Smoker   • Smokeless tobacco: Never Used   Substance Use Topics   • Alcohol use: Yes     Comment: Caffeine use   • Drug use: No       Family History   Problem Relation Age of Onset   • Hypertension Mother    • Heart attack Mother         At an advanced age.   • Migraines Sister    • Heart attack Sister         At a young age.   • Cancer Maternal Grandmother         Colon       Review of Systems   Constitutional: Negative for chills, fatigue and fever.   Gastrointestinal: Negative for abdominal distention, abdominal pain, nausea and vomiting.   Endocrine: Positive for heat intolerance. Negative for cold intolerance.   Genitourinary: Positive for dyspareunia. Negative for dysuria, pelvic pain, vaginal bleeding, vaginal discharge and vaginal pain.   Musculoskeletal: Negative for gait problem.   Skin: Negative for rash.   Neurological: Negative for dizziness and headaches.   Hematological: Does not bruise/bleed easily.   Psychiatric/Behavioral: Negative for behavioral problems.       /80   Ht 170.2 cm (67\")   Wt 78.5 kg (173 lb)   BMI 27.10 kg/m²     Physical Exam  Vitals signs and nursing note reviewed.   Constitutional:       Appearance: Normal appearance.   HENT:      Head: Normocephalic and atraumatic.   Eyes:      Pupils: Pupils are equal, round, and reactive to light.   Neck:      Musculoskeletal: Normal range of motion and neck supple. No muscular tenderness.   Cardiovascular:      Rate and Rhythm: Normal rate.   Pulmonary:      Effort: Pulmonary effort is normal.   Chest:      Breasts: Breasts are symmetrical.         Right: No swelling, bleeding, inverted nipple, mass, nipple discharge, skin change or " tenderness.         Left: No swelling, bleeding, inverted nipple, mass, nipple discharge, skin change or tenderness.   Abdominal:      General: There is no distension.      Palpations: Abdomen is soft. There is no mass.      Tenderness: There is no abdominal tenderness. There is no guarding.      Hernia: No hernia is present. There is no hernia in the left inguinal area or right inguinal area.   Genitourinary:     General: Normal vulva.      Exam position: Lithotomy position.      Pubic Area: No rash or pubic lice.       Labia:         Right: No rash, tenderness, lesion or injury.         Left: No rash, tenderness, lesion or injury.       Urethra: No prolapse, urethral pain, urethral swelling or urethral lesion.      Vagina: No signs of injury and foreign body. No vaginal discharge, erythema, tenderness, bleeding, lesions or prolapsed vaginal walls.      Adnexa:         Right: No mass, tenderness or fullness.          Left: No mass, tenderness or fullness.        Comments: Uterus and cervix surgically absent. No tenderness or masses noted on bimanual exam  Musculoskeletal: Normal range of motion.   Lymphadenopathy:      Cervical: No cervical adenopathy.      Upper Body:      Right upper body: No supraclavicular, axillary or pectoral adenopathy.      Left upper body: No supraclavicular, axillary or pectoral adenopathy.      Lower Body: No right inguinal adenopathy. No left inguinal adenopathy.   Skin:     General: Skin is warm and dry.   Neurological:      General: No focal deficit present.      Mental Status: She is alert and oriented to person, place, and time.      Cranial Nerves: No cranial nerve deficit.   Psychiatric:         Mood and Affect: Mood normal.         Behavior: Behavior normal.         Thought Content: Thought content normal.         Judgment: Judgment normal.          Assessment     1) GYN annual well woman exam.   2) Postmenopausal   3) Vaginal dryness  4) Decreased libido    Plan     1) Breast  Health - Clinical breast exam & mammogram yearly, Self breast awareness. Schedule screening mammogram.   2) Pap - not indicated secondary to prior hysterectomy  3) STD-no  4) Smoking status- nonsmoker  5) Colon health - screening colonoscopy recommended if not up to date  6) Patient's Body mass index is 27.1 kg/m². BMI is above normal parameters. Recommendations include: exercise counseling and nutrition counseling.  7) Bone health - Weight bearing exercise, dietary calcium recommendations and vitamin D  reviewed.   8) Encouraged 150 minutes of exercise per week if not medically contraindicated  9) Discussed treatment options for vaginal dryness including lubricants with intercourse, OTC vaginal moisturizers, and hormonal creams. We reviewed the risks associated with hormone therapies including stroke, DVT, and cancers. She desires to try Estrace vaginal cream  10) Discussed causes of and treatment options for decreased libido including exercise, stress reduction, and masturbation.     Follow up prn and one year    Vanessa Domingo, APRN  1/19/2021  14:02 EST

## 2021-01-22 ENCOUNTER — TELEPHONE (OUTPATIENT)
Dept: OBSTETRICS AND GYNECOLOGY | Facility: CLINIC | Age: 61
End: 2021-01-22

## 2021-01-22 NOTE — TELEPHONE ENCOUNTER
----- Message from ANA Julien sent at 1/21/2021  8:38 AM EST -----  Please let the pt know her mammogram was normal. Thanks

## 2021-02-05 ENCOUNTER — PRIOR AUTHORIZATION (OUTPATIENT)
Dept: CARDIOLOGY | Facility: CLINIC | Age: 61
End: 2021-02-05

## 2021-02-05 NOTE — TELEPHONE ENCOUNTER
I called and s/w pt, she is using [facility], and is using Praulent 75 mg.  We will have to resubmit PA for the Praulent.   [facility] will be faxing pt's request for Praulent.

## 2021-02-12 NOTE — TELEPHONE ENCOUNTER
Pt is aware and picked up a recent rx from the pharmacy.  She will contact the pharmacy for a new rx when its needed.

## 2021-02-17 ENCOUNTER — DOCUMENTATION (OUTPATIENT)
Dept: PHYSICAL THERAPY | Facility: CLINIC | Age: 61
End: 2021-02-17

## 2021-03-01 ENCOUNTER — OFFICE VISIT (OUTPATIENT)
Dept: CARDIOLOGY | Facility: CLINIC | Age: 61
End: 2021-03-01

## 2021-03-01 VITALS
HEART RATE: 71 BPM | WEIGHT: 180.2 LBS | DIASTOLIC BLOOD PRESSURE: 84 MMHG | SYSTOLIC BLOOD PRESSURE: 120 MMHG | HEIGHT: 67 IN | BODY MASS INDEX: 28.28 KG/M2

## 2021-03-01 DIAGNOSIS — I87.2 CHRONIC VENOUS INSUFFICIENCY: ICD-10-CM

## 2021-03-01 DIAGNOSIS — E78.2 MIXED HYPERLIPIDEMIA: ICD-10-CM

## 2021-03-01 DIAGNOSIS — Z82.49 FAMILY HISTORY OF PREMATURE CORONARY ARTERY DISEASE: Primary | ICD-10-CM

## 2021-03-01 PROCEDURE — 93000 ELECTROCARDIOGRAM COMPLETE: CPT | Performed by: INTERNAL MEDICINE

## 2021-03-01 PROCEDURE — 99213 OFFICE O/P EST LOW 20 MIN: CPT | Performed by: INTERNAL MEDICINE

## 2021-03-01 NOTE — PROGRESS NOTES
Date of Office Visit: 2021  Encounter Provider: Feliciano Cordova MD  Place of Service: [facility] CARDIOLOGY  Patient Name: Sarah Borges  :1960    Chief Complaint   Patient presents with   • Hyperlipidemia   :     HPI: Sarah Borges is a 60 y.o. female who presents today to follow up. I have reviewed prior notes and there are no changes except for any new updates described below. I have also reviewed any information entered into the medical record by the patient or by ancillary staff.     She has a sister who has premature coronary artery disease and had a large anterior infarct. The patient has familial hyperlipidemia and I follow her for that. She had a CT calcium score in 2016 which was completely normal (score of zero).  She has not had any chest discomfort. She does have chronic venous insufficiency and has some lower extremity edema. She saw the vein care clinic who felt that it was not severe enough to require intervention unless she would prefer it for cosmetic reasons. She has decided to wait on that.  She is very active and is doing well.    Past Medical History:   Diagnosis Date   • Agatston coronary artery calcium score less than 100     score was zero, 2016   • Cervical neuritis     C4-C5 of the left   • Chronic venous insufficiency    • Hyperlipidemia    • Lateral epicondylitis of right elbow    • Lumbar radiculopathy    • Neck pain        Past Surgical History:   Procedure Laterality Date   • HYSTERECTOMY     • NECK SURGERY     • VEIN LIGATION AND STRIPPING         Social History     Socioeconomic History   • Marital status:      Spouse name: Not on file   • Number of children: Not on file   • Years of education: Not on file   • Highest education level: Not on file   Tobacco Use   • Smoking status: Former Smoker   • Smokeless tobacco: Never Used   Substance and Sexual Activity   • Alcohol use: Yes     Comment: Caffeine use   • Drug use: No  "  • Sexual activity: Defer     Birth control/protection: Surgical       Family History   Problem Relation Age of Onset   • Hypertension Mother    • Heart attack Mother         At an advanced age.   • Migraines Sister    • Heart attack Sister         At a young age.   • Cancer Maternal Grandmother         Colon       Review of Systems   All other systems reviewed and are negative.      No Known Allergies      Current Outpatient Medications:   •  Alirocumab (Praluent) 75 MG/ML solution auto-injector, Inject 75 mg under the skin into the appropriate area as directed Every 14 (Fourteen) Days., Disp: 2 pen, Rfl: 6  •  atorvastatin (LIPITOR) 40 MG tablet, Take 1 tablet by mouth Daily., Disp: 90 tablet, Rfl: 3  •  CALCIUM PO, Take  by mouth Daily., Disp: , Rfl:   •  estradiol (ESTRACE VAGINAL) 0.1 MG/GM vaginal cream, Insert 2 g into the vagina Daily., Disp: 42.5 g, Rfl: 12  •  ibuprofen (ADVIL,MOTRIN) 200 MG tablet, Take 200 mg by mouth Every 6 (Six) Hours As Needed for mild pain (1-3)., Disp: , Rfl:   •  vitamin B-12 (CYANOCOBALAMIN) 1000 MCG tablet, Take 1,000 mcg by mouth Daily., Disp: , Rfl:      Objective:     Vitals:    03/01/21 0912   BP: 120/84   BP Location: Left arm   Pulse: 71   Weight: 81.7 kg (180 lb 3.2 oz)   Height: 170.2 cm (67\")     Body mass index is 28.22 kg/m².    Physical Exam   Constitutional: She is oriented to person, place, and time. She appears well-developed and well-nourished.   HENT:   Head: Normocephalic.   Nose: Nose normal.   Masked   Eyes: Conjunctivae and EOM are normal.   Neck: Normal range of motion. No JVD present.   Cardiovascular: Normal rate, regular rhythm, normal heart sounds and intact distal pulses.   No murmur heard.  Pulmonary/Chest: Effort normal and breath sounds normal.   Abdominal: Soft. There is no abdominal tenderness.   Musculoskeletal: Normal range of motion.         General: No edema.      Comments: Multiple varicosities, no edema   Neurological: She is alert and " oriented to person, place, and time. No cranial nerve deficit.   Skin: Skin is warm and dry. No rash noted.   Psychiatric: She has a normal mood and affect. Her behavior is normal. Judgment and thought content normal.   Vitals reviewed.        ECG 12 Lead    Date/Time: 3/1/2021 9:30 AM  Performed by: Feliciano Cordova MD  Authorized by: Feliciano Cordova MD   Comparison: compared with previous ECG   Similar to previous ECG  Rhythm: sinus rhythm  Conduction: conduction normal  ST Segments: ST segments normal  T Waves: T waves normal  QRS axis: normal  Other: no other findings    Clinical impression: normal ECG              Assessment:       Diagnosis Plan   1. Family history of premature coronary artery disease     2. Mixed hyperlipidemia     3. Chronic venous insufficiency            Plan:       She has a strong family history of premature CAD and she has hyperlipidemia, but her CT calcium score in 2016 was zero.  This will need to be repeated in 5-10 years.  She will remain on atorvastatin and alirocumab.    Her venous insufficiency is stable.    Sincerely,       Felicaino Cordova MD

## 2021-03-07 ENCOUNTER — IMMUNIZATION (OUTPATIENT)
Dept: VACCINE CLINIC | Facility: HOSPITAL | Age: 61
End: 2021-03-07

## 2021-03-07 PROCEDURE — 91300 HC SARSCOV02 VAC 30MCG/0.3ML IM: CPT | Performed by: INTERNAL MEDICINE

## 2021-03-07 PROCEDURE — 0001A: CPT | Performed by: INTERNAL MEDICINE

## 2021-03-12 RX ORDER — ALIROCUMAB 75 MG/ML
75 INJECTION, SOLUTION SUBCUTANEOUS
Qty: 2 PEN | Refills: 6 | Status: SHIPPED | OUTPATIENT
Start: 2021-03-12 | End: 2021-11-04

## 2021-03-28 ENCOUNTER — IMMUNIZATION (OUTPATIENT)
Dept: VACCINE CLINIC | Facility: HOSPITAL | Age: 61
End: 2021-03-28

## 2021-03-28 PROCEDURE — 0002A: CPT | Performed by: INTERNAL MEDICINE

## 2021-03-28 PROCEDURE — 91300 HC SARSCOV02 VAC 30MCG/0.3ML IM: CPT | Performed by: INTERNAL MEDICINE

## 2021-03-30 RX ORDER — ATORVASTATIN CALCIUM 40 MG/1
40 TABLET, FILM COATED ORAL DAILY
Qty: 90 TABLET | Refills: 3 | Status: SHIPPED | OUTPATIENT
Start: 2021-03-30 | End: 2022-02-23 | Stop reason: SDUPTHER

## 2021-03-30 NOTE — TELEPHONE ENCOUNTER
Pt last seen 3/1/21 JK  Upcoming 3/2/22 JK      Progress Notes  Feliciano Cordova MD (Physician) • • Cardiology  Procedure Orders   1. ECG 12 Lead [[id number]] ordered by Feliciano Cordova MD   Expand AllCollapse All    Date of Office Visit: 2021  Encounter Provider: Feliciano Cordova MD  Place of Service: [facility] CARDIOLOGY  Patient Name: Sarha Borges  :1960         Chief Complaint   Patient presents with   • Hyperlipidemia   :      HPI: Sarah Borges is a 60 y.o. female who presents today to follow up. I have reviewed prior notes and there are no changes except for any new updates described below. I have also reviewed any information entered into the medical record by the patient or by ancillary staff.      She has a sister who has premature coronary artery disease and had a large anterior infarct. The patient has familial hyperlipidemia and I follow her for that. She had a CT calcium score in 2016 which was completely normal (score of zero).  She has not had any chest discomfort. She does have chronic venous insufficiency and has some lower extremity edema. She saw the vein care clinic who felt that it was not severe enough to require intervention unless she would prefer it for cosmetic reasons. She has decided to wait on that.  She is very active and is doing well.     Medical History        Past Medical History:   Diagnosis Date   • Agatston coronary artery calcium score less than 100       score was zero, 2016   • Cervical neuritis       C4-C5 of the left   • Chronic venous insufficiency     • Hyperlipidemia     • Lateral epicondylitis of right elbow     • Lumbar radiculopathy     • Neck pain              Surgical History         Past Surgical History:   Procedure Laterality Date   • HYSTERECTOMY       • NECK SURGERY       • VEIN LIGATION AND STRIPPING                Social History   Social History            Socioeconomic History   • Marital status:       "  Spouse name: Not on file   • Number of children: Not on file   • Years of education: Not on file   • Highest education level: Not on file   Tobacco Use   • Smoking status: Former Smoker   • Smokeless tobacco: Never Used   Substance and Sexual Activity   • Alcohol use: Yes       Comment: Caffeine use   • Drug use: No   • Sexual activity: Defer       Birth control/protection: Surgical                  Family History   Problem Relation Age of Onset   • Hypertension Mother     • Heart attack Mother           At an advanced age.   • Migraines Sister     • Heart attack Sister           At a young age.   • Cancer Maternal Grandmother           Colon         Review of Systems   All other systems reviewed and are negative.        No Known Allergies        Current Outpatient Medications:   •  Alirocumab (Praluent) 75 MG/ML solution auto-injector, Inject 75 mg under the skin into the appropriate area as directed Every 14 (Fourteen) Days., Disp: 2 pen, Rfl: 6  •  atorvastatin (LIPITOR) 40 MG tablet, Take 1 tablet by mouth Daily., Disp: 90 tablet, Rfl: 3  •  CALCIUM PO, Take  by mouth Daily., Disp: , Rfl:   •  estradiol (ESTRACE VAGINAL) 0.1 MG/GM vaginal cream, Insert 2 g into the vagina Daily., Disp: 42.5 g, Rfl: 12  •  ibuprofen (ADVIL,MOTRIN) 200 MG tablet, Take 200 mg by mouth Every 6 (Six) Hours As Needed for mild pain (1-3)., Disp: , Rfl:   •  vitamin B-12 (CYANOCOBALAMIN) 1000 MCG tablet, Take 1,000 mcg by mouth Daily., Disp: , Rfl:      Objective:      Vitals       Vitals:     03/01/21 0912   BP: 120/84   BP Location: Left arm   Pulse: 71   Weight: 81.7 kg (180 lb 3.2 oz)   Height: 170.2 cm (67\")         Body mass index is 28.22 kg/m².     Physical Exam   Constitutional: She is oriented to person, place, and time. She appears well-developed and well-nourished.   HENT:   Head: Normocephalic.   Nose: Nose normal.   Masked   Eyes: Conjunctivae and EOM are normal.   Neck: Normal range of motion. No JVD present. "   Cardiovascular: Normal rate, regular rhythm, normal heart sounds and intact distal pulses.   No murmur heard.  Pulmonary/Chest: Effort normal and breath sounds normal.   Abdominal: Soft. There is no abdominal tenderness.   Musculoskeletal: Normal range of motion.         General: No edema.      Comments: Multiple varicosities, no edema   Neurological: She is alert and oriented to person, place, and time. No cranial nerve deficit.   Skin: Skin is warm and dry. No rash noted.   Psychiatric: She has a normal mood and affect. Her behavior is normal. Judgment and thought content normal.   Vitals reviewed.           ECG 12 Lead   Date/Time: 3/1/2021 9:30 AM  Performed by: Feliciano Cordova MD  Authorized by: Feliciano Cordova MD   Comparison: compared with previous ECG   Similar to previous ECG  Rhythm: sinus rhythm  Conduction: conduction normal  ST Segments: ST segments normal  T Waves: T waves normal  QRS axis: normal  Other: no other findings    Clinical impression: normal ECG                Assessment:        Diagnosis Plan   1. Family history of premature coronary artery disease      2. Mixed hyperlipidemia      3. Chronic venous insufficiency               Plan:       She has a strong family history of premature CAD and she has hyperlipidemia, but her CT calcium score in 2016 was zero.  This will need to be repeated in 5-10 years.  She will remain on atorvastatin and alirocumab.     Her venous insufficiency is stable.     Sincerely,         Feliciano Cordova MD

## 2021-11-04 RX ORDER — ALIROCUMAB 75 MG/ML
INJECTION, SOLUTION SUBCUTANEOUS
Qty: 2 ML | Refills: 4 | Status: SHIPPED | OUTPATIENT
Start: 2021-11-04 | End: 2022-02-23 | Stop reason: SDUPTHER

## 2022-01-18 ENCOUNTER — PRIOR AUTHORIZATION (OUTPATIENT)
Dept: CARDIOLOGY | Facility: CLINIC | Age: 62
End: 2022-01-18

## 2022-02-23 RX ORDER — ATORVASTATIN CALCIUM 40 MG/1
40 TABLET, FILM COATED ORAL DAILY
Qty: 90 TABLET | Refills: 0 | Status: SHIPPED | OUTPATIENT
Start: 2022-02-23 | End: 2022-05-26

## 2022-02-23 RX ORDER — ALIROCUMAB 75 MG/ML
75 INJECTION, SOLUTION SUBCUTANEOUS
Qty: 2 ML | Refills: 4 | Status: SHIPPED | OUTPATIENT
Start: 2022-02-23 | End: 2022-06-24

## 2022-03-11 ENCOUNTER — PROCEDURE VISIT (OUTPATIENT)
Dept: OBSTETRICS AND GYNECOLOGY | Facility: CLINIC | Age: 62
End: 2022-03-11

## 2022-03-11 ENCOUNTER — APPOINTMENT (OUTPATIENT)
Dept: WOMENS IMAGING | Facility: HOSPITAL | Age: 62
End: 2022-03-11

## 2022-03-11 DIAGNOSIS — Z12.31 VISIT FOR SCREENING MAMMOGRAM: Primary | ICD-10-CM

## 2022-03-11 PROCEDURE — 77063 BREAST TOMOSYNTHESIS BI: CPT | Performed by: OBSTETRICS & GYNECOLOGY

## 2022-03-11 PROCEDURE — 77063 BREAST TOMOSYNTHESIS BI: CPT | Performed by: RADIOLOGY

## 2022-03-11 PROCEDURE — 77067 SCR MAMMO BI INCL CAD: CPT | Performed by: OBSTETRICS & GYNECOLOGY

## 2022-03-11 PROCEDURE — 77067 SCR MAMMO BI INCL CAD: CPT | Performed by: RADIOLOGY

## 2022-04-20 ENCOUNTER — OFFICE VISIT (OUTPATIENT)
Dept: CARDIOLOGY | Facility: CLINIC | Age: 62
End: 2022-04-20

## 2022-04-20 VITALS
HEART RATE: 62 BPM | SYSTOLIC BLOOD PRESSURE: 122 MMHG | WEIGHT: 179 LBS | DIASTOLIC BLOOD PRESSURE: 80 MMHG | BODY MASS INDEX: 28.09 KG/M2 | HEIGHT: 67 IN

## 2022-04-20 DIAGNOSIS — Z82.49 FAMILY HISTORY OF PREMATURE CORONARY ARTERY DISEASE: Primary | ICD-10-CM

## 2022-04-20 DIAGNOSIS — E78.01 FAMILIAL HYPERCHOLESTEROLEMIA: ICD-10-CM

## 2022-04-20 PROCEDURE — 93000 ELECTROCARDIOGRAM COMPLETE: CPT | Performed by: INTERNAL MEDICINE

## 2022-04-20 PROCEDURE — 99213 OFFICE O/P EST LOW 20 MIN: CPT | Performed by: INTERNAL MEDICINE

## 2022-04-20 NOTE — PROGRESS NOTES
Date of Office Visit: 2022  Encounter Provider: Feliciano Cordova MD  Place of Service: [facility] CARDIOLOGY  Patient Name: Sarah Borges  :1960    Chief Complaint   Patient presents with   • Hyperlipidemia   :     HPI: Sarah Borges is a 61 y.o. female who presents today to follow up. I have reviewed prior notes and there are no changes except for any new updates described below. I have also reviewed any information entered into the medical record by the patient or by ancillary staff.     She has a sister who has premature coronary artery disease and had a large anterior infarct. The patient has familial hyperlipidemia and I follow her for that. She had a CT calcium score in 2016 which was completely normal (score of zero).  She has not had any chest discomfort. She does have chronic venous insufficiency and has some lower extremity edema. She saw the vein care clinic who felt that it was not severe enough to require intervention unless she would prefer it for cosmetic reasons. She has decided to wait on that.  She is very active and is doing well.    Past Medical History:   Diagnosis Date   • Agatston coronary artery calcium score less than 100     score was zero, 2016   • Cervical neuritis     C4-C5 of the left   • Chronic venous insufficiency    • Hyperlipidemia    • Lateral epicondylitis of right elbow    • Lumbar radiculopathy    • Neck pain        Past Surgical History:   Procedure Laterality Date   • HYSTERECTOMY     • NECK SURGERY     • VEIN LIGATION AND STRIPPING         Social History     Socioeconomic History   • Marital status:    Tobacco Use   • Smoking status: Former Smoker   • Smokeless tobacco: Never Used   Vaping Use   • Vaping Use: Never used   Substance and Sexual Activity   • Alcohol use: Yes     Comment: Caffeine use   • Drug use: No   • Sexual activity: Defer     Birth control/protection: Surgical       Family History   Problem  "Relation Age of Onset   • Hypertension Mother    • Heart attack Mother         At an advanced age.   • Migraines Sister    • Heart attack Sister         At a young age.   • Cancer Maternal Grandmother         Colon       Review of Systems   All other systems reviewed and are negative.      No Known Allergies      Current Outpatient Medications:   •  Alirocumab (Praluent) 75 MG/ML solution auto-injector, Inject 1 mL under the skin into the appropriate area as directed Every 14 (Fourteen) Days., Disp: 2 mL, Rfl: 4  •  atorvastatin (LIPITOR) 40 MG tablet, Take 1 tablet by mouth Daily., Disp: 90 tablet, Rfl: 0  •  CALCIUM PO, Take  by mouth Daily., Disp: , Rfl:   •  estradiol (ESTRACE VAGINAL) 0.1 MG/GM vaginal cream, Insert 2 g into the vagina Daily., Disp: 42.5 g, Rfl: 12  •  ibuprofen (ADVIL,MOTRIN) 200 MG tablet, Take 200 mg by mouth Every 6 (Six) Hours As Needed for mild pain (1-3)., Disp: , Rfl:   •  vitamin B-12 (CYANOCOBALAMIN) 1000 MCG tablet, Take 1,000 mcg by mouth Daily., Disp: , Rfl:      Objective:     Vitals:    04/20/22 1515   BP: 122/80   BP Location: Left arm   Pulse: 62   Weight: 81.2 kg (179 lb)   Height: 170.2 cm (67\")     Body mass index is 28.04 kg/m².    Physical Exam  Vitals reviewed.   Constitutional:       General: She is not in acute distress.     Appearance: She is well-developed.   HENT:      Head: Normocephalic.      Nose: Nose normal.      Mouth/Throat:      Comments: masked  Eyes:      Conjunctiva/sclera: Conjunctivae normal.   Neck:      Vascular: No JVD.   Cardiovascular:      Rate and Rhythm: Normal rate and regular rhythm.      Pulses: Normal pulses and intact distal pulses.      Heart sounds: Normal heart sounds. No murmur heard.  Pulmonary:      Effort: Pulmonary effort is normal.      Breath sounds: Normal breath sounds.   Abdominal:      Palpations: Abdomen is soft.      Tenderness: There is no abdominal tenderness.   Musculoskeletal:         General: No swelling. Normal range of " motion.      Cervical back: Normal range of motion.      Comments: Multiple varicosities, no edema   Skin:     General: Skin is warm and dry.      Findings: No rash.   Neurological:      General: No focal deficit present.      Mental Status: She is alert and oriented to person, place, and time.      Cranial Nerves: No cranial nerve deficit.   Psychiatric:         Mood and Affect: Mood normal.         Behavior: Behavior normal.         Thought Content: Thought content normal.           ECG 12 Lead    Date/Time: 4/20/2022 3:29 PM  Performed by: Feliciano Cordova MD  Authorized by: Feliciano Cordova MD   Comparison: compared with previous ECG   Similar to previous ECG  Rhythm: sinus rhythm  Conduction: conduction normal  ST Segments: ST segments normal  T Waves: T waves normal  QRS axis: normal  Other: no other findings    Clinical impression: normal ECG           Assessment:       Diagnosis Plan   1. Family history of premature coronary artery disease     2. Familial hypercholesterolemia        Plan:       She has a strong family history of premature CAD and she has hyperlipidemia, but her CT calcium score in 2016 was zero. We will repeat this in 2026. She will remain on atorvastatin and alirocumab. She knows to call me if she has any issues in between visits.     Sincerely,       Feliciano Cordova MD

## 2022-05-26 RX ORDER — ATORVASTATIN CALCIUM 40 MG/1
TABLET, FILM COATED ORAL
Qty: 90 TABLET | Refills: 0 | Status: SHIPPED | OUTPATIENT
Start: 2022-05-26 | End: 2022-08-25

## 2022-06-23 ENCOUNTER — TELEPHONE (OUTPATIENT)
Dept: CARDIOLOGY | Facility: CLINIC | Age: 62
End: 2022-06-23

## 2022-06-23 DIAGNOSIS — E78.01 FAMILIAL HYPERCHOLESTEROLEMIA: Primary | ICD-10-CM

## 2022-06-23 NOTE — TELEPHONE ENCOUNTER
Pharmacy is requesting refills on Praluent.  She has not had a lipid panel according to our system since 2020.  Has she had a cholesterol panel drawn at her PCP office or anywhere else?  If so, please obtain.  If not, we will place orders for her to have blood work at the main lab at [facility] in the next week or 2.  Thank you.

## 2022-06-24 RX ORDER — ALIROCUMAB 75 MG/ML
75 INJECTION, SOLUTION SUBCUTANEOUS
Qty: 2 PEN | Refills: 0 | Status: SHIPPED | OUTPATIENT
Start: 2022-06-24 | End: 2022-06-27 | Stop reason: SDUPTHER

## 2022-06-24 NOTE — TELEPHONE ENCOUNTER
Patient has not had lipid drawn.  She will need to get it at .  Can you please place an order?    Dodie

## 2022-06-27 ENCOUNTER — LAB (OUTPATIENT)
Dept: LAB | Facility: HOSPITAL | Age: 62
End: 2022-06-27

## 2022-06-27 DIAGNOSIS — E78.01 FAMILIAL HYPERCHOLESTEROLEMIA: ICD-10-CM

## 2022-06-27 LAB
ALBUMIN SERPL-MCNC: 4 G/DL (ref 3.5–5.2)
ALBUMIN/GLOB SERPL: 1.6 G/DL
ALP SERPL-CCNC: 84 U/L (ref 39–117)
ALT SERPL W P-5'-P-CCNC: 20 U/L (ref 1–33)
ANION GAP SERPL CALCULATED.3IONS-SCNC: 7.2 MMOL/L (ref 5–15)
AST SERPL-CCNC: 21 U/L (ref 1–32)
BILIRUB SERPL-MCNC: 0.5 MG/DL (ref 0–1.2)
BUN SERPL-MCNC: 10 MG/DL (ref 8–23)
BUN/CREAT SERPL: 14.9 (ref 7–25)
CALCIUM SPEC-SCNC: 9.2 MG/DL (ref 8.6–10.5)
CHLORIDE SERPL-SCNC: 105 MMOL/L (ref 98–107)
CHOLEST SERPL-MCNC: 153 MG/DL (ref 0–200)
CO2 SERPL-SCNC: 31.8 MMOL/L (ref 22–29)
CREAT SERPL-MCNC: 0.67 MG/DL (ref 0.57–1)
DEPRECATED RDW RBC AUTO: 37.1 FL (ref 37–54)
EGFRCR SERPLBLD CKD-EPI 2021: 99.6 ML/MIN/1.73
ERYTHROCYTE [DISTWIDTH] IN BLOOD BY AUTOMATED COUNT: 11 % (ref 12.3–15.4)
GLOBULIN UR ELPH-MCNC: 2.5 GM/DL
GLUCOSE SERPL-MCNC: 90 MG/DL (ref 65–99)
HCT VFR BLD AUTO: 42.3 % (ref 34–46.6)
HDLC SERPL-MCNC: 62 MG/DL (ref 40–60)
HGB BLD-MCNC: 13.4 G/DL (ref 12–15.9)
LDLC SERPL CALC-MCNC: 72 MG/DL (ref 0–100)
LDLC/HDLC SERPL: 1.13 {RATIO}
MCH RBC QN AUTO: 29 PG (ref 26.6–33)
MCHC RBC AUTO-ENTMCNC: 31.7 G/DL (ref 31.5–35.7)
MCV RBC AUTO: 91.6 FL (ref 79–97)
PLATELET # BLD AUTO: 223 10*3/MM3 (ref 140–450)
PMV BLD AUTO: 10.4 FL (ref 6–12)
POTASSIUM SERPL-SCNC: 4.1 MMOL/L (ref 3.5–5.2)
PROT SERPL-MCNC: 6.5 G/DL (ref 6–8.5)
RBC # BLD AUTO: 4.62 10*6/MM3 (ref 3.77–5.28)
SODIUM SERPL-SCNC: 144 MMOL/L (ref 136–145)
TRIGL SERPL-MCNC: 104 MG/DL (ref 0–150)
VLDLC SERPL-MCNC: 19 MG/DL (ref 5–40)
WBC NRBC COR # BLD: 7.1 10*3/MM3 (ref 3.4–10.8)

## 2022-06-27 PROCEDURE — 36415 COLL VENOUS BLD VENIPUNCTURE: CPT

## 2022-06-27 PROCEDURE — 80053 COMPREHEN METABOLIC PANEL: CPT

## 2022-06-27 PROCEDURE — 85027 COMPLETE CBC AUTOMATED: CPT

## 2022-06-27 PROCEDURE — 80061 LIPID PANEL: CPT

## 2022-06-27 RX ORDER — ALIROCUMAB 75 MG/ML
75 INJECTION, SOLUTION SUBCUTANEOUS
Qty: 2 PEN | Refills: 11 | Status: SHIPPED | OUTPATIENT
Start: 2022-06-27

## 2022-08-25 RX ORDER — ATORVASTATIN CALCIUM 40 MG/1
TABLET, FILM COATED ORAL
Qty: 90 TABLET | Refills: 0 | OUTPATIENT
Start: 2022-08-25 | End: 2022-11-16

## 2022-12-09 ENCOUNTER — TELEPHONE (OUTPATIENT)
Dept: CARDIOLOGY | Facility: CLINIC | Age: 62
End: 2022-12-09

## 2022-12-09 NOTE — TELEPHONE ENCOUNTER
Spoke to pt, she verbalizes understanding.  Her PCP retired so she currently does not have one, but she will try her old PCP's office and let us know how it goes.

## 2022-12-09 NOTE — TELEPHONE ENCOUNTER
Pt left a VM asking if JK would be willing to put in a referral for neuro for her.  She states she is going on day 4 of a migraine and she would like to see Dr. Schumacher that is in our office building.    Referral can be faxed to [phone]

## 2022-12-12 ENCOUNTER — OFFICE VISIT (OUTPATIENT)
Dept: INTERNAL MEDICINE | Age: 62
End: 2022-12-12

## 2022-12-12 VITALS
HEART RATE: 71 BPM | OXYGEN SATURATION: 99 % | DIASTOLIC BLOOD PRESSURE: 80 MMHG | SYSTOLIC BLOOD PRESSURE: 105 MMHG | BODY MASS INDEX: 27.47 KG/M2 | HEIGHT: 67 IN | WEIGHT: 175 LBS

## 2022-12-12 DIAGNOSIS — Z79.899 HIGH RISK MEDICATION USE: ICD-10-CM

## 2022-12-12 DIAGNOSIS — R51.9 ACUTE NONINTRACTABLE HEADACHE, UNSPECIFIED HEADACHE TYPE: ICD-10-CM

## 2022-12-12 DIAGNOSIS — Z12.11 COLON CANCER SCREENING: ICD-10-CM

## 2022-12-12 DIAGNOSIS — Z86.16 HISTORY OF COVID-19: ICD-10-CM

## 2022-12-12 DIAGNOSIS — Z76.89 ENCOUNTER TO ESTABLISH CARE: Primary | ICD-10-CM

## 2022-12-12 DIAGNOSIS — H53.8 BLURRED VISION, BILATERAL: ICD-10-CM

## 2022-12-12 PROCEDURE — 99214 OFFICE O/P EST MOD 30 MIN: CPT

## 2022-12-12 RX ORDER — CHOLECALCIFEROL (VITAMIN D3) 25 MCG
TABLET,CHEWABLE ORAL
COMMUNITY
End: 2022-12-12 | Stop reason: SDUPTHER

## 2022-12-12 RX ORDER — LIRAGLUTIDE 6 MG/ML
INJECTION, SOLUTION SUBCUTANEOUS
COMMUNITY
End: 2022-12-12

## 2022-12-12 RX ORDER — TIRZEPATIDE 5 MG/.5ML
INJECTION, SOLUTION SUBCUTANEOUS
COMMUNITY
Start: 2022-11-25 | End: 2023-02-20

## 2022-12-12 RX ORDER — ATORVASTATIN CALCIUM 40 MG/1
40 TABLET, FILM COATED ORAL DAILY
COMMUNITY
Start: 2022-12-06 | End: 2023-03-06

## 2022-12-12 NOTE — PROGRESS NOTES
"    I N T E R N A L  M E D I C I N E  Sandy Pelletier, ANA    ENCOUNTER DATE:  12/12/2022    Sarah Borges / 62 y.o. / female      CHIEF COMPLAINT / REASON FOR OFFICE VISIT     Establish Care (Headaches for the past week./)      ASSESSMENT & PLAN     Diagnoses and all orders for this visit:    1. Encounter to establish care (Primary)  -     CBC & Differential  -     Comprehensive Metabolic Panel  -     Hemoglobin A1c  -     TSH+Free T4    2. Acute nonintractable headache, unspecified headache type  -     MRI Brain With & Without Contrast; Future  -     CT Head Without Contrast  -     Ambulatory Referral to Neurology    3. Blurred vision, bilateral  -     MRI Brain With & Without Contrast; Future  -     CT Head Without Contrast  -     Ambulatory Referral to Neurology    4. History of COVID-19  -     Ambulatory Referral to Neurology    5. High risk medication use  -     Compliance Drug Analysis, Ur - Urine, Clean Catch  -     Drug Screen 11 w/Conf, Serum    6. Colon cancer screening  -     Ambulatory Referral For Screening Colonoscopy         SUMMARY/DISCUSSION  • No neurological deficits on exam today.  Will proceed with STAT Head CT.  • Agreeable for Bupap prescription and she will provide an update via [employer] on whether this medication is beneficial.    • Advised to visit ER for any acutely worsening headache, worsening vision, numbness, tingling, weakness, speech changes.  She acknowledged understanding.      Next Appointment with me: Visit date not found    Return in about 1 month (around 1/12/2023) for Annual physical.      VITAL SIGNS     Visit Vitals  /80   Pulse 71   Ht 170.2 cm (67\")   Wt 79.4 kg (175 lb)   SpO2 99%   BMI 27.41 kg/m²             Wt Readings from Last 3 Encounters:   12/12/22 79.4 kg (175 lb)   11/16/22 74.8 kg (165 lb)   04/20/22 81.2 kg (179 lb)     Body mass index is 27.41 kg/m².        MEDICATIONS AT THE TIME OF OFFICE VISIT     Current Outpatient Medications on File Prior to Visit " "  Medication Sig Dispense Refill   • Alirocumab (Praluent) 75 MG/ML solution auto-injector Inject 1 mL under the skin into the appropriate area as directed Every 14 (Fourteen) Days. 2 pen 11   • atorvastatin (LIPITOR) 40 MG tablet Take 40 mg by mouth Daily.     • Calcium Carbonate-Vit D-Min (CALCIUM 1200 PO)      • ibuprofen (ADVIL,MOTRIN) 200 MG tablet Take 200 mg by mouth Every 6 (Six) Hours As Needed for mild pain (1-3).     • Magnesium 400 MG capsule      • Mounjaro 5 MG/0.5ML solution pen-injector INJECT 5 MG SUBCUTANEOUSLY ONCE WEEKLY.     • vitamin B-12 (CYANOCOBALAMIN) 1000 MCG tablet Take 1,000 mcg by mouth Daily.     • [DISCONTINUED] Cyanocobalamin (B-12) 1000 MCG capsule      • [DISCONTINUED] Liraglutide (Saxenda) 18 MG/3ML injection pen Saxenda 3 mg/0.5 mL (18 mg/3 mL) subcutaneous pen injector   Inject Saxenda SQ with a dose of 3 mg daily     • [DISCONTINUED] brompheniramine-pseudoephedrine-DM 30-2-10 MG/5ML syrup Take 5 mL by mouth 4 (Four) Times a Day As Needed for Congestion or Cough. 118 mL 0   • [DISCONTINUED] estradiol (ESTRACE VAGINAL) 0.1 MG/GM vaginal cream Insert 2 g into the vagina Daily. 42.5 g 12   • [DISCONTINUED] Mounjaro 2.5 MG/0.5ML solution pen-injector INJECT 2.5 MG SUBCUTANEOUSLY ONCE WEEKLY.       No current facility-administered medications on file prior to visit.        HISTORY OF PRESENT ILLNESS     Here today requesting neurology referral for complaint of bilateral blurred vision, with frontal headache x 7 days.  Symptoms started a couple of weeks ago following infection with COVID-19, with bilateral eye blurred vision and sensation of floaters in her left eye, along with \"curtain\" sensation in left eye.  She has been evaluated by optometry/  optomalogy, and they advised no abnormalities were seen; no signs of retina detachment.  She was prescribed new glasses, but she is not wearing.  She reports the curtain sensation has been steadily improving over the last few days, but she " is still experiencing bilateral eye blurred vision.  Her headache pain is typical of her prior headaches, but has lasted the last 7 days, which is unusual for her.  Headache pain is rated as 5/10.  Denies photophobia, nausea, vomiting.  She is taking ibuprofen 800 mg with some benefit.      Prior hx of cervical spine surgery.  No prior hx of migraines.  No signs/ symptoms of URI.    Followed by cardiology, Dr. Cordova, annually for familial hyperlipidemia and family history of premature CAD in sister.  CT calcium score in February 2016 was normal (score of zero).      HLD: Praluent 75 mg every 14 days, atorvastatin 40 mg daily.  June 2022 lipid panel with LDL of 72 and triglycerides 104.    Followed by OBGYN, and is update on mammogram and pap.      Patient Care Team:  Sandy Pelletier APRN as PCP - General (Family Medicine)  Roni Araujo MD as PCP - Family Medicine    REVIEW OF SYSTEMS     Review of Systems   Constitutional: Negative for chills, fever and unexpected weight change.   Eyes: Positive for visual disturbance.   Respiratory: Negative for cough, chest tightness and shortness of breath.    Cardiovascular: Negative for chest pain, palpitations and leg swelling.   Neurological: Positive for headaches. Negative for dizziness, weakness and light-headedness.   Psychiatric/Behavioral: The patient is not nervous/anxious.           PHYSICAL EXAMINATION     Physical Exam  Vitals reviewed.   Constitutional:       General: She is not in acute distress.     Appearance: Normal appearance. She is not ill-appearing, toxic-appearing or diaphoretic.   HENT:      Head: Normocephalic and atraumatic.      Right Ear: Tympanic membrane, ear canal and external ear normal. There is no impacted cerumen.      Left Ear: Tympanic membrane, ear canal and external ear normal. There is no impacted cerumen.   Eyes:      Extraocular Movements: Extraocular movements intact.      Conjunctiva/sclera: Conjunctivae normal.      Pupils:  Pupils are equal, round, and reactive to light.   Cardiovascular:      Rate and Rhythm: Normal rate and regular rhythm.      Heart sounds: Normal heart sounds.   Pulmonary:      Effort: Pulmonary effort is normal.      Breath sounds: Normal breath sounds.   Musculoskeletal:      Right lower leg: No edema.      Left lower leg: No edema.   Lymphadenopathy:      Cervical: No cervical adenopathy.   Neurological:      Mental Status: She is alert and oriented to person, place, and time. Mental status is at baseline.      Cranial Nerves: No cranial nerve deficit.      Sensory: No sensory deficit.      Motor: No weakness.      Coordination: Coordination normal.      Gait: Gait normal.   Psychiatric:         Mood and Affect: Mood normal.         Behavior: Behavior normal.         Thought Content: Thought content normal.         Judgment: Judgment normal.           REVIEWED DATA     Labs:           Imaging:            Medical Tests:           Summary of old records / correspondence / consultant report:           Request outside records:

## 2022-12-13 DIAGNOSIS — G44.209 TENSION-TYPE HEADACHE, NOT INTRACTABLE, UNSPECIFIED CHRONICITY PATTERN: Primary | ICD-10-CM

## 2022-12-13 LAB
ALBUMIN SERPL-MCNC: 4.2 G/DL (ref 3.8–4.8)
ALBUMIN/GLOB SERPL: 1.9 {RATIO} (ref 1.2–2.2)
ALP SERPL-CCNC: 78 IU/L (ref 44–121)
ALT SERPL-CCNC: 11 IU/L (ref 0–32)
AST SERPL-CCNC: 20 IU/L (ref 0–40)
BASOPHILS # BLD AUTO: 0 X10E3/UL (ref 0–0.2)
BASOPHILS NFR BLD AUTO: 0 %
BILIRUB SERPL-MCNC: 0.5 MG/DL (ref 0–1.2)
BUN SERPL-MCNC: 12 MG/DL (ref 8–27)
BUN/CREAT SERPL: 17 (ref 12–28)
CALCIUM SERPL-MCNC: 9.5 MG/DL (ref 8.7–10.3)
CHLORIDE SERPL-SCNC: 100 MMOL/L (ref 96–106)
CO2 SERPL-SCNC: 28 MMOL/L (ref 20–29)
CREAT SERPL-MCNC: 0.71 MG/DL (ref 0.57–1)
EGFRCR SERPLBLD CKD-EPI 2021: 96 ML/MIN/1.73
EOSINOPHIL # BLD AUTO: 0.1 X10E3/UL (ref 0–0.4)
EOSINOPHIL NFR BLD AUTO: 1 %
ERYTHROCYTE [DISTWIDTH] IN BLOOD BY AUTOMATED COUNT: 11.4 % (ref 11.7–15.4)
GLOBULIN SER CALC-MCNC: 2.2 G/DL (ref 1.5–4.5)
GLUCOSE SERPL-MCNC: 84 MG/DL (ref 70–99)
HBA1C MFR BLD: 5.4 % (ref 4.8–5.6)
HCT VFR BLD AUTO: 42.5 % (ref 34–46.6)
HGB BLD-MCNC: 13.6 G/DL (ref 11.1–15.9)
IMM GRANULOCYTES # BLD AUTO: 0 X10E3/UL (ref 0–0.1)
IMM GRANULOCYTES NFR BLD AUTO: 0 %
LYMPHOCYTES # BLD AUTO: 1.7 X10E3/UL (ref 0.7–3.1)
LYMPHOCYTES NFR BLD AUTO: 26 %
MCH RBC QN AUTO: 28 PG (ref 26.6–33)
MCHC RBC AUTO-ENTMCNC: 32 G/DL (ref 31.5–35.7)
MCV RBC AUTO: 88 FL (ref 79–97)
MONOCYTES # BLD AUTO: 0.6 X10E3/UL (ref 0.1–0.9)
MONOCYTES NFR BLD AUTO: 10 %
NEUTROPHILS # BLD AUTO: 4.2 X10E3/UL (ref 1.4–7)
NEUTROPHILS NFR BLD AUTO: 63 %
PLATELET # BLD AUTO: 210 X10E3/UL (ref 150–450)
POTASSIUM SERPL-SCNC: 3.8 MMOL/L (ref 3.5–5.2)
PROT SERPL-MCNC: 6.4 G/DL (ref 6–8.5)
RBC # BLD AUTO: 4.85 X10E6/UL (ref 3.77–5.28)
SODIUM SERPL-SCNC: 141 MMOL/L (ref 134–144)
SPECIMEN STATUS: NORMAL
T4 FREE SERPL-MCNC: 1.32 NG/DL (ref 0.82–1.77)
TSH SERPL DL<=0.005 MIU/L-ACNC: 3.05 UIU/ML (ref 0.45–4.5)
WBC # BLD AUTO: 6.6 X10E3/UL (ref 3.4–10.8)

## 2022-12-13 RX ORDER — BUTALBITAL/ACETAMINOPHEN 50MG-325MG
1 TABLET ORAL EVERY 8 HOURS PRN
Qty: 21 TABLET | Refills: 0 | Status: SHIPPED | OUTPATIENT
Start: 2022-12-13

## 2022-12-13 NOTE — PROGRESS NOTES
Discussed case with APRN. Thought to have tension type headaches with further workup in progress. Will prescribe bultalbital/acetaminophen 50/325 q8 PRN for headaches #21, no refill. Reviewed Vickey report and medical history. Consider workup to rule out GCA with ESR/CRP or pituitary tumor (MRI already ordered).

## 2022-12-14 ENCOUNTER — PATIENT ROUNDING (BHMG ONLY) (OUTPATIENT)
Dept: INTERNAL MEDICINE | Age: 62
End: 2022-12-14

## 2022-12-14 ENCOUNTER — HOSPITAL ENCOUNTER (OUTPATIENT)
Dept: CT IMAGING | Facility: HOSPITAL | Age: 62
Discharge: HOME | End: 2022-12-14

## 2022-12-14 ENCOUNTER — TELEPHONE (OUTPATIENT)
Dept: INTERNAL MEDICINE | Age: 62
End: 2022-12-14

## 2022-12-14 DIAGNOSIS — R51.9 ACUTE NONINTRACTABLE HEADACHE, UNSPECIFIED HEADACHE TYPE: Primary | ICD-10-CM

## 2022-12-14 DIAGNOSIS — H53.8 BLURRED VISION, BILATERAL: ICD-10-CM

## 2022-12-14 PROCEDURE — 70450 CT HEAD/BRAIN W/O DYE: CPT

## 2022-12-14 NOTE — TELEPHONE ENCOUNTER
Please call pt.    Radiologist called and advised Head CT was normal.  Will plan to proceed with MRI Brain.      Also recommend scheduling lab only appointment in the next few days to check labs to help rule in/ out inflammatory conditions.

## 2022-12-14 NOTE — PROGRESS NOTES
A My-Chart message has been sent to the patient for PATIENT ROUNDING with [facility]

## 2022-12-15 ENCOUNTER — TELEPHONE (OUTPATIENT)
Dept: INTERNAL MEDICINE | Age: 62
End: 2022-12-15

## 2022-12-15 NOTE — TELEPHONE ENCOUNTER
Discussed with pt recommendation to have inflammatory labs drawn ASAP to rule in/ out possible GCA related to her vision complaints/ headache.  As of today, she reports her symptoms are improving.  She will look into having labs drawn at the hospital soon and let me know.  She is aware to visit ER for any changes in vision or worsening headache.

## 2022-12-16 ENCOUNTER — LAB (OUTPATIENT)
Dept: LAB | Facility: HOSPITAL | Age: 62
End: 2022-12-16

## 2022-12-16 PROCEDURE — 36415 COLL VENOUS BLD VENIPUNCTURE: CPT

## 2022-12-16 PROCEDURE — G0483 DRUG TEST DEF 22+ CLASSES: HCPCS

## 2022-12-16 PROCEDURE — 85652 RBC SED RATE AUTOMATED: CPT

## 2022-12-16 PROCEDURE — 86140 C-REACTIVE PROTEIN: CPT

## 2022-12-16 PROCEDURE — 80307 DRUG TEST PRSMV CHEM ANLYZR: CPT

## 2022-12-20 ENCOUNTER — TELEPHONE (OUTPATIENT)
Dept: INTERNAL MEDICINE | Age: 62
End: 2022-12-20

## 2022-12-20 ENCOUNTER — LAB (OUTPATIENT)
Dept: LAB | Facility: HOSPITAL | Age: 62
End: 2022-12-20

## 2022-12-20 ENCOUNTER — HOSPITAL ENCOUNTER (OUTPATIENT)
Dept: MRI IMAGING | Facility: HOSPITAL | Age: 62
Discharge: HOME | End: 2022-12-20

## 2022-12-20 DIAGNOSIS — R51.9 ACUTE NONINTRACTABLE HEADACHE, UNSPECIFIED HEADACHE TYPE: Primary | ICD-10-CM

## 2022-12-20 DIAGNOSIS — R35.0 FREQUENT URINATION: Primary | ICD-10-CM

## 2022-12-20 DIAGNOSIS — H53.8 BLURRED VISION, BILATERAL: ICD-10-CM

## 2022-12-20 DIAGNOSIS — R51.9 ACUTE NONINTRACTABLE HEADACHE, UNSPECIFIED HEADACHE TYPE: ICD-10-CM

## 2022-12-20 LAB
AMPHETAMINES SERPL QL SCN: NEGATIVE NG/ML
BACTERIA UR QL AUTO: ABNORMAL /HPF
BARBITURATES SERPL QL SCN: NEGATIVE UG/ML
BENZODIAZ SERPL QL SCN: NEGATIVE NG/ML
BILIRUB UR QL STRIP: NEGATIVE
CANNABINOIDS SERPL QL SCN: NEGATIVE NG/ML
CLARITY UR: CLEAR
COCAINE+BZE SERPL QL SCN: NEGATIVE NG/ML
COLOR UR: ABNORMAL
ETHANOL SERPL-MCNC: NEGATIVE GM/DL
GLUCOSE UR STRIP-MCNC: NEGATIVE MG/DL
HGB UR QL STRIP.AUTO: NEGATIVE
HYALINE CASTS UR QL AUTO: ABNORMAL /LPF
KETONES UR QL STRIP: NEGATIVE
LEUKOCYTE ESTERASE UR QL STRIP.AUTO: ABNORMAL
METHADONE SERPL QL SCN: NEGATIVE NG/ML
NITRITE UR QL STRIP: POSITIVE
OPIATES SERPL QL SCN: NEGATIVE NG/ML
OXYCODONE+OXYMORPHONE SERPLBLD QL SCN: NEGATIVE NG/ML
PCP SERPL QL SCN: NEGATIVE NG/ML
PH UR STRIP.AUTO: 6.5 [PH] (ref 5–8)
PROPOXYPH SERPL QL SCN: NEGATIVE NG/ML
PROT UR QL STRIP: NEGATIVE
RBC # UR STRIP: ABNORMAL /HPF
REF LAB TEST METHOD: ABNORMAL
SP GR UR STRIP: 1.01 (ref 1–1.03)
SQUAMOUS #/AREA URNS HPF: ABNORMAL /HPF
UROBILINOGEN UR QL STRIP: ABNORMAL
WBC # UR STRIP: ABNORMAL /HPF

## 2022-12-20 PROCEDURE — A9577 INJ MULTIHANCE: HCPCS

## 2022-12-20 PROCEDURE — 87077 CULTURE AEROBIC IDENTIFY: CPT

## 2022-12-20 PROCEDURE — 81001 URINALYSIS AUTO W/SCOPE: CPT

## 2022-12-20 PROCEDURE — 0 GADOBENATE DIMEGLUMINE 529 MG/ML SOLUTION

## 2022-12-20 PROCEDURE — 87086 URINE CULTURE/COLONY COUNT: CPT

## 2022-12-20 PROCEDURE — 70553 MRI BRAIN STEM W/O & W/DYE: CPT

## 2022-12-20 PROCEDURE — 87186 SC STD MICRODIL/AGAR DIL: CPT

## 2022-12-20 RX ADMIN — GADOBENATE DIMEGLUMINE 15 ML: 529 INJECTION, SOLUTION INTRAVENOUS at 13:03

## 2022-12-20 NOTE — TELEPHONE ENCOUNTER
Recommend that pt be evaluated before or after her MRI in our office today for her symptoms + urinalysis.

## 2022-12-20 NOTE — TELEPHONE ENCOUNTER
Pt could also consider dropping off urine sample today, and then scheduling telehealth with an available provider today.

## 2022-12-20 NOTE — TELEPHONE ENCOUNTER
Caller: Sarah Borges    Relationship: Self    Best call back number: [phone]    What is the medical concern/diagnosis: HEADACHES    What specialty or service is being requested: NEUROLOGY    What is the provider, practice or medical service name: [facility]    What is the office location:     What is the office phone number: [phone]    Any additional details: PATIENT STATES THE [employer] NEUROLOGIST IS SCHEDULED OUT TOO FAR AND SHE CAN GET IN SOONER AT THIS LOCATION.    PLEASE ADVISE

## 2022-12-20 NOTE — TELEPHONE ENCOUNTER
Caller: Jony Sarah ADOLPH    Relationship: Self    Best call back number: [phone]    What medication are you requesting: ANTIBIOTIC    What are your current symptoms: FREQUENT URINATION, BURNING    How long have you been experiencing symptoms: ABOUT 3 DAYS    Have you had these symptoms before:    [x] Yes  [] No    Have you been treated for these symptoms before:   [x] Yes  [] No    If a prescription is needed, what is your preferred pharmacy and phone number: [facility] [id number] [place] [address] [phone] [facility] [phone]      Additional notes: PATIENT STATES SHE HAS BEEN TAKING OVER THE COUNTER [employer]O SUPPLEMENTS BUT THEY DO NOT CLEAR THE SYMPTOMS. ONLY RELIEVES THEM FOR A SHORT AMOUNT OF TIME.    PLEASE ADVISE

## 2022-12-21 ENCOUNTER — TELEPHONE (OUTPATIENT)
Dept: INTERNAL MEDICINE | Age: 62
End: 2022-12-21

## 2022-12-21 ENCOUNTER — TELEMEDICINE (OUTPATIENT)
Dept: INTERNAL MEDICINE | Age: 62
End: 2022-12-21

## 2022-12-21 DIAGNOSIS — G44.209 TENSION-TYPE HEADACHE, NOT INTRACTABLE, UNSPECIFIED CHRONICITY PATTERN: ICD-10-CM

## 2022-12-21 DIAGNOSIS — N30.90 CYSTITIS: Primary | ICD-10-CM

## 2022-12-21 DIAGNOSIS — H53.8 BLURRED VISION, BILATERAL: ICD-10-CM

## 2022-12-21 LAB
AMPHETAMINES SERPL QL SCN: NEGATIVE NG/ML
BARBITURATES SERPL QL SCN: NEGATIVE UG/ML
BENZODIAZ SERPL QL SCN: NEGATIVE NG/ML
CANNABINOIDS SERPL QL SCN: NEGATIVE NG/ML
COCAINE+BZE SERPL QL SCN: NEGATIVE NG/ML
ETHANOL SERPL-MCNC: NEGATIVE GM/DL
METHADONE SERPL QL SCN: NEGATIVE NG/ML
OPIATES SERPL QL SCN: NEGATIVE NG/ML
OXYCODONE+OXYMORPHONE SERPLBLD QL SCN: NEGATIVE NG/ML
PCP SERPL QL SCN: NEGATIVE NG/ML
PROPOXYPH SERPL QL SCN: NEGATIVE NG/ML

## 2022-12-21 PROCEDURE — 99214 OFFICE O/P EST MOD 30 MIN: CPT

## 2022-12-21 RX ORDER — NITROFURANTOIN 25; 75 MG/1; MG/1
100 CAPSULE ORAL 2 TIMES DAILY
Qty: 10 CAPSULE | Refills: 0 | Status: SHIPPED | OUTPATIENT
Start: 2022-12-21 | End: 2022-12-26

## 2022-12-21 NOTE — TELEPHONE ENCOUNTER
----- Message from ANA Ortiz sent at 12/21/2022  8:52 AM EST -----  Please call pt.    MRI Brain was essentially normal.  No acute intracranial abnormality was seen.  Are your symptoms improving?

## 2022-12-21 NOTE — PROGRESS NOTES
I N T E R N A L  M E D I C I N E  ANA MORALES      ENCOUNTER DATE:  12/21/2022    Sarah Borges / 62 y.o. / female        You have chosen to receive care through a telehealth visit.  Do you consent to use a video/audio connection for your medical care today? YES    Location of patient and provider are in [place]       CHIEF COMPLAINT / REASON FOR OFFICE VISIT     TELEHEALTH ENCOUNTER:  Urinary Tract Infection (/)      ASSESSMENT & PLAN     Diagnoses and all orders for this visit:    1. Cystitis (Primary)  -     nitrofurantoin, macrocrystal-monohydrate, (Macrobid) 100 MG capsule; Take 1 capsule by mouth 2 (Two) Times a Day for 5 days.  Dispense: 10 capsule; Refill: 0    2. Blurred vision, bilateral    3. Tension-type headache, not intractable, unspecified chronicity pattern          SUMMARY/DISCUSSION  Urine with 3+ leukocytes and positive for nitrites.  No blood.  Agreeable to treat with antibiotic.  Recommended increased hydration with water.    She is aware to visit the ER for any fever, chills, back, flank or abdominal pain, diminished urine output.    Return for worsening or non improving symptoms.    Discussed with pt possibility of obtaining brain MRA to further evaluate her complaint of  headache/ bilateral blurry vision.  She declined at this time, and would like to establish with neurology first.  She is aware to seek care the ER for any vision changes, worsening headache, numbness, tingling, weakness.    Time spent: 20 minutes    Next Appointment with me: 1/12/2023    Return for Next scheduled follow up.        HISTORY OF PRESENT ILLNESS     Pt unable to connect to video after multiple attempts.  Agreeable to proceed with phone conversation.      Symptoms started on Saturday evening with urinary frequency and minor dysuria.  No back, flank or abdominal pain, vaginal discharge.  No hematuria.  Denies fever, chills.  No prior history of kidney stones.  Denies any recent antibiotic use.    She  reports her headache symptoms are improving and are less intense with decreased frequency, and vision is less blurry.  By her report, opthalmology exam was normal.  CT Head/ MRI Brain was unremarkable, and inflammatory markers were normal.  She plans on scheduling follow up with neurology in the near future.        REVIEWED DATA     Labs:           Imaging:           Medical Tests:           Summary of old records / correspondence / consultant report:           Request outside records:         Template created by Zan Delaney MD

## 2022-12-21 NOTE — TELEPHONE ENCOUNTER
[facility]IS[facility] INSTRUCTING PT TO RETURN CALL TO BE READ  RESULTS. LETTER SENT VIA [employer]

## 2022-12-22 LAB — BACTERIA SPEC AEROBE CULT: ABNORMAL

## 2022-12-23 LAB — DRUGS UR: NORMAL

## 2022-12-29 ENCOUNTER — PRIOR AUTHORIZATION (OUTPATIENT)
Dept: CARDIOLOGY | Facility: CLINIC | Age: 62
End: 2022-12-29

## 2023-01-12 ENCOUNTER — OFFICE VISIT (OUTPATIENT)
Dept: INTERNAL MEDICINE | Age: 63
End: 2023-01-12
Payer: COMMERCIAL

## 2023-01-12 VITALS
WEIGHT: 172 LBS | HEART RATE: 71 BPM | TEMPERATURE: 97.1 F | SYSTOLIC BLOOD PRESSURE: 124 MMHG | OXYGEN SATURATION: 98 % | BODY MASS INDEX: 27 KG/M2 | HEIGHT: 67 IN | DIASTOLIC BLOOD PRESSURE: 80 MMHG

## 2023-01-12 DIAGNOSIS — G43.109 MIGRAINE WITH AURA AND WITHOUT STATUS MIGRAINOSUS, NOT INTRACTABLE: ICD-10-CM

## 2023-01-12 DIAGNOSIS — Z00.00 ANNUAL PHYSICAL EXAM: Primary | ICD-10-CM

## 2023-01-12 DIAGNOSIS — R41.89 SUBJECTIVE MEMORY COMPLAINTS: ICD-10-CM

## 2023-01-12 PROBLEM — E78.00 HYPERCHOLESTEROLEMIA: Status: ACTIVE | Noted: 2022-08-30

## 2023-01-12 PROCEDURE — 99396 PREV VISIT EST AGE 40-64: CPT

## 2023-01-12 RX ORDER — RIZATRIPTAN BENZOATE 5 MG/1
5 TABLET ORAL ONCE AS NEEDED
Qty: 9 TABLET | Refills: 0 | Status: SHIPPED | OUTPATIENT
Start: 2023-01-12 | End: 2023-02-14

## 2023-01-12 NOTE — PROGRESS NOTES
"    I N T E R N A L  M E D I C I N E  Sandy Pelletier, APRN      ENCOUNTER DATE:  01/12/2023    Sarah Borges / 62 y.o. / female      CHIEF COMPLAINT     Annual Exam    She continues to experience ongoing, intermittent headaches, along with associated vision changes in left eye.  These symptoms are new for her since early December 2022.  She describes the visualization of a \"semi [place]\" in her left eye, which partially obstructs her vision and contributes to temporary blurry vision.  After vision changes start, she then develops a frontal headache.  Does have a prior history of migraines ocurring in the same area, for which she takes ibuprofen with benefit.  Her prior history of migraines has been proceeded by auras consisting of flashing lights and lines.  Denies symptoms of photophobia, phonophobia, nausea, vomiting.  She has never tried a triptan. Fiorcet did not help.  Has been evaluated by optometry without any abnormalities found.  Prior CT Head and Brain MRI were both unremarkable.  She has had prior negative inflammatory markers.  She is scheduled with neurology in March 2023.    She also complains today of feeling more forgetful x 6 months.  She works a busy, stressful job and has been making increased mistakes at work, including scheduling tasks on the incorrect date and misremembering specific facts.   No family history of alzheimer or Dementia disease.  She is taking daily vitamin B12 supplement.        VITALS     Visit Vitals  /80   Pulse 71   Temp 97.1 °F (36.2 °C)   Ht 170.2 cm (67.01\")   Wt 78 kg (172 lb)   SpO2 98%   BMI 26.93 kg/m²       BP Readings from Last 3 Encounters:   01/12/23 124/80   12/12/22 105/80   11/16/22 137/71     Wt Readings from Last 3 Encounters:   01/12/23 78 kg (172 lb)   12/19/22 79.4 kg (175 lb)   12/12/22 79.4 kg (175 lb)      Body mass index is 26.93 kg/m².       MEDICATIONS     Current Outpatient Medications on File Prior to Visit   Medication Sig Dispense Refill   • " Alirocumab (Praluent) 75 MG/ML solution auto-injector Inject 1 mL under the skin into the appropriate area as directed Every 14 (Fourteen) Days. 2 pen 11   • atorvastatin (LIPITOR) 40 MG tablet Take 40 mg by mouth Daily.     • butalbital-acetaminophen  MG tablet tablet Take 1 tablet by mouth Every 8 (Eight) Hours As Needed (tension type headaches). 21 tablet 0   • Calcium Carbonate-Vit D-Min (CALCIUM 1200 PO)      • ibuprofen (ADVIL,MOTRIN) 200 MG tablet Take 200 mg by mouth Every 6 (Six) Hours As Needed for mild pain (1-3).     • Magnesium 400 MG capsule      • Mounjaro 5 MG/0.5ML solution pen-injector INJECT 5 MG SUBCUTANEOUSLY ONCE WEEKLY.     • vitamin B-12 (CYANOCOBALAMIN) 1000 MCG tablet Take 1,000 mcg by mouth Daily.       No current facility-administered medications on file prior to visit.         HISTORY OF PRESENT ILLNESS     Sarah presents for annual health maintenance visit.    · General health: good  · Lifestyle:  · Attempting to lose weight?: Yes   · Diet: eats moderately healthy  · Exercise: moderately active at work/home  · Tobacco: Former smoker   · Alcohol: occasional/infrequent  · Work: Full-time  · Reproductive health:  · Sexually active?: Yes   · Sexual problems?: No problems  · Concern for STD?: No    · Sees Gynecologist?: Yes   · Estefani/Postmenopausal?: Yes   · Domestic abuse concerns: No   · Depression Screening:      PHQ-2/PHQ-9 Depression Screening 12/21/2022   Little Interest or Pleasure in Doing Things 0-->not at all   Feeling Down, Depressed or Hopeless 0-->not at all   Trouble Falling or Staying Asleep, or Sleeping Too Much 0-->not at all   Feeling Tired or Having Little Energy 0-->not at all   Poor Appetite or Overeating 0-->not at all   Feeling Bad about Yourself - or that You are a Failure or Have Let Yourself or Your Family Down 0-->not at all   Trouble Concentrating on Things, Such as Reading the Newspaper or Watching Television 0-->not at all   Moving or Speaking So Slowly  that Other People Could Have Noticed? Or the Opposite - Being So Fidgety 0-->not at all   Thoughts that You Would be Better Off Dead or of Hurting Yourself in Some Way 0-->not at all   PHQ-9: Brief Depression Severity Measure Score 0         PHQ-2: 0 (Not depressed)   PHQ-9: 0 (Negative screening for depression)    Patient Care Team:  Sandy Pelletier APRN as PCP - General (Family Medicine)  Roni Araujo MD as PCP - Family Medicine      ALLERGIES  No Known Allergies     PFSH:     The following portions of the patient's history were reviewed and updated as appropriate: Allergies / Current Medications / Past Medical History / Surgical History / Social History / Family History    PROBLEM LIST   Patient Active Problem List   Diagnosis   • Hyperlipidemia   • Chronic venous insufficiency   • Family history of premature coronary artery disease   • Hypercholesterolemia       PAST MEDICAL HISTORY  Past Medical History:   Diagnosis Date   • Agatston coronary artery calcium score less than 100     score was zero, 2/2016   • Cervical neuritis     C4-C5 of the left   • Chronic venous insufficiency    • Headache unknown    occasionally for many years   • Hyperlipidemia    • Lateral epicondylitis of right elbow    • Lumbar radiculopathy    • Neck pain    • Scoliosis unknown    surgery February, 2019   • Visual impairment 3 or 4 weeks ago    blurred vision       SURGICAL HISTORY  Past Surgical History:   Procedure Laterality Date   • HYSTERECTOMY     • NECK SURGERY     • SPINE SURGERY  2019    2 rods and 22 screws placed in spine   • TONSILLECTOMY  1972   • TUBAL ABDOMINAL LIGATION  1993   • VEIN LIGATION AND STRIPPING         SOCIAL HISTORY  Social History     Socioeconomic History   • Marital status:    Tobacco Use   • Smoking status: Former     Packs/day: 1.00     Types: Cigarettes   • Smokeless tobacco: Never   Vaping Use   • Vaping Use: Never used   Substance and Sexual Activity   • Alcohol use: Yes      Comment: social drinker   • Drug use: No   • Sexual activity: Yes     Partners: Male     Birth control/protection: Tubal ligation       FAMILY HISTORY  Family History   Problem Relation Age of Onset   • Hypertension Mother    • Heart attack Mother         At an advanced age.   • Hyperlipidemia Mother    • Alcohol abuse Father    • Migraines Sister    • Heart attack Sister         At a young age.   • Cancer Maternal Grandmother         colon   • Breast cancer Maternal Aunt        IMMUNIZATION HISTORY  Immunization History   Administered Date(s) Administered   • COVID-19 (MODERNA) 1st, 2nd, 3rd Dose Only 12/02/2021   • COVID-19 (PFIZER) PURPLE CAP 03/07/2021, 03/28/2021   • FluLaval/Fluzone >6mos 11/15/2018   • Hepatitis A 05/14/2018, 11/15/2018         REVIEW OF SYSTEMS     Review of Systems   Constitutional: Negative for chills, fever and unexpected weight change.   Eyes: Positive for visual disturbance. Negative for photophobia, pain, discharge and redness.   Respiratory: Negative for cough, chest tightness and shortness of breath.    Cardiovascular: Negative for chest pain, palpitations and leg swelling.   Gastrointestinal: Negative for abdominal pain, constipation, diarrhea, nausea and vomiting.   Neurological: Positive for headaches. Negative for dizziness, speech difficulty, weakness, light-headedness and numbness.   Psychiatric/Behavioral: The patient is not nervous/anxious.          PHYSICAL EXAMINATION     Physical Exam  Vitals reviewed.   Constitutional:       General: She is not in acute distress.     Appearance: Normal appearance. She is not ill-appearing, toxic-appearing or diaphoretic.   HENT:      Head: Normocephalic and atraumatic.      Right Ear: Tympanic membrane, ear canal and external ear normal. There is no impacted cerumen.      Left Ear: Tympanic membrane, ear canal and external ear normal. There is no impacted cerumen.      Nose: Nose normal. No congestion or rhinorrhea.      Mouth/Throat:       Mouth: Mucous membranes are moist.      Pharynx: Oropharynx is clear. No oropharyngeal exudate or posterior oropharyngeal erythema.   Eyes:      Extraocular Movements: Extraocular movements intact.      Conjunctiva/sclera: Conjunctivae normal.      Pupils: Pupils are equal, round, and reactive to light.   Cardiovascular:      Rate and Rhythm: Normal rate and regular rhythm.      Heart sounds: Normal heart sounds.   Pulmonary:      Effort: Pulmonary effort is normal. No respiratory distress.      Breath sounds: Normal breath sounds.   Abdominal:      General: Bowel sounds are normal.      Palpations: Abdomen is soft.      Tenderness: There is no abdominal tenderness.   Musculoskeletal:         General: Normal range of motion.      Cervical back: Normal range of motion and neck supple.      Right lower leg: No edema.      Left lower leg: No edema.   Lymphadenopathy:      Cervical: No cervical adenopathy.   Skin:     General: Skin is warm and dry.   Neurological:      General: No focal deficit present.      Mental Status: She is alert and oriented to person, place, and time. Mental status is at baseline.      Cranial Nerves: No cranial nerve deficit.      Sensory: No sensory deficit.      Motor: No weakness.      Gait: Gait normal.   Psychiatric:         Mood and Affect: Mood normal.         Behavior: Behavior normal.         Thought Content: Thought content normal.         Judgment: Judgment normal.         REVIEWED DATA      Labs:    Lab Results   Component Value Date     12/12/2022    K 3.8 12/12/2022    CALCIUM 9.5 12/12/2022    AST 20 12/12/2022    ALT 11 12/12/2022    BUN 12 12/12/2022    CREATININE 0.71 12/12/2022    CREATININE 0.67 06/27/2022    CREATININE 0.63 03/31/2020    EGFRIFNONA 97 03/31/2020    EGFRIFAFRI 97 08/09/2016       Lab Results   Component Value Date    GLUCOSE 84 12/12/2022    HGBA1C 5.4 12/12/2022    HGBA1C 5.22 08/09/2016    TSH 3.050 12/12/2022    FREET4 1.32 12/12/2022       Lab  Results   Component Value Date    LDL 72 06/27/2022    HDL 62 (H) 06/27/2022    TRIG 104 06/27/2022    CHOLHDLRATIO 3.52 08/09/2016       Lab Results   Component Value Date    SIUZ68PG 47.2 08/09/2016        Lab Results   Component Value Date    WBC 6.6 12/12/2022    HGB 13.6 12/12/2022    MCV 88 12/12/2022     12/12/2022       Lab Results   Component Value Date    GLUCOSEU Negative 12/20/2022    BLOODU Negative 12/20/2022    NITRITEU Positive (A) 12/20/2022    LEUKOCYTESUR Large (3+) (A) 12/20/2022        No results found for: HEPCVIRUSABY    Imaging:        Medical Tests:        ASSESSMENT & PLAN     ANNUAL WELLNESS EXAM / PHYSICAL     Diagnoses and all orders for this visit:    1. Annual physical exam (Primary)    2. Migraine with aura and without status migrainosus, not intractable  -     rizatriptan (MAXALT) 5 MG tablet; Take 1 tablet by mouth 1 (One) Time As Needed for Migraine for up to 1 dose. May repeat in 2 hours if needed  Dispense: 9 tablet; Refill: 0    3. Subjective memory complaints         Summary/Discussion:     • No neurological deficits on today's exam.  She is agreeable to trial triptan and will let me know if any benefit.  • Will schedule MOCA exam in 1 month to further investigate pt's memory complaints.      Next Appointment with me: Visit date not found    Return in about 1 month (around 2/12/2023) for 30 minute appointment for cognitive exam.      HEALTHCARE MAINTENANCE ISSUES     Cancer Screening:  · Colon: Initial/Next screening at age: DUE NOW  · Repeat colon cancer screening: N/A at this time  · Breast: Recommended monthly self exams; annual professional exam  · Mammogram: every 1 year  · Cervical: 3 years  · Skin: Monthly self skin examination, annual exam by health professional  · Lung: Does not meet criteria for lung cancer screening.   · Other:    Screening Labs & Tests:  · Lab results reviewed & discussed with the patient or test orders placed today.  · EKG:  · CV Screening:  Lipid panel  · DEXA (65+ or postmenopausal with risk factors):   · HEP C (If born 8524-6040, or risk factors): Previously had negative screen  · Other:     Immunization/Vaccinations (to be given today unless deferred by patient)  · Influenza: Recommended annual influenza vaccine  · Hepatitis A: Verify immunization records  · Hepatitis B: Verify immunization records  · Tetanus/Pertussis: Declined by patient  · Pneumovax: Not needed at this time  · Shingles: Recommended Shingrix at pharmacy  · COVID: Recommended the bivalent booster shot    Lifestyle Counseling:  · Lifestyle Modifications: Continue good lifestyle choices/modifications and Reduce exposure to stress if possible  · Safety Issues: Always wear seatbelt, Avoid texting while driving   · Use sunscreen, regular skin examination  · Recommended annual dental/vision examination.  · Emotional/Stress/Sleep: Reviewed and  given when appropriate      Health Maintenance   Topic Date Due   • HEPATITIS C SCREENING  Never done   • COLORECTAL CANCER SCREENING  04/12/2019   • COVID-19 Vaccine (4 - Booster for Pfizer series) 01/27/2022   • PAP SMEAR  04/11/2022   • ZOSTER VACCINE (1 of 2) 01/12/2023 (Originally 9/10/2010)   • INFLUENZA VACCINE  03/31/2023 (Originally 8/1/2022)   • TDAP/TD VACCINES (1 - Tdap) 01/12/2024 (Originally 9/10/1979)   • LIPID PANEL  06/27/2023   • ANNUAL PHYSICAL  01/12/2024   • MAMMOGRAM  03/11/2024   • Pneumococcal Vaccine 0-64  Aged Out

## 2023-01-31 ENCOUNTER — PRE-PROCEDURE SCREENING (OUTPATIENT)
Dept: GASTROENTEROLOGY | Facility: CLINIC | Age: 63
End: 2023-01-31
Payer: COMMERCIAL

## 2023-01-31 ENCOUNTER — PREP FOR SURGERY (OUTPATIENT)
Dept: OTHER | Facility: HOSPITAL | Age: 63
End: 2023-01-31
Payer: COMMERCIAL

## 2023-01-31 DIAGNOSIS — Z80.0 FH: COLON CANCER: Primary | ICD-10-CM

## 2023-01-31 NOTE — TELEPHONE ENCOUNTER
LAST SCOPE 10YRS ( NO RECORDS)   --No personal history of polyps--No family history of polyps--Family history of  colon cancer--No blood thinners--Medications:            Alirocumab (Praluent) 75 MG/ML solution auto-injector  atorvastatin (LIPITOR) 40 MG tablet  butalbital-acetaminophen  MG tablet tablet  Calcium Carbonate-Vit D-Min (CALCIUM 1200 PO)  ibuprofen (ADVIL,MOTRIN) 200 MG tablet  Magnesium 400 MG capsule  Mounjaro 5 MG/0.5ML solution pen-injector    rizatriptan (MAXALT) 5 MG tablet  vitamin B-12 (CYANOCOBALAMIN) 1000 MCG tablet       QUESTIONNAIRE SCEEENING  HAS BEEN SENT TO DOCTOR FOR REVIEW

## 2023-02-02 ENCOUNTER — TELEPHONE (OUTPATIENT)
Dept: GASTROENTEROLOGY | Facility: CLINIC | Age: 63
End: 2023-02-02
Payer: COMMERCIAL

## 2023-02-03 ENCOUNTER — TELEPHONE (OUTPATIENT)
Dept: GASTROENTEROLOGY | Facility: CLINIC | Age: 63
End: 2023-02-03
Payer: COMMERCIAL

## 2023-02-03 PROBLEM — Z80.0 FH: COLON CANCER: Status: ACTIVE | Noted: 2023-02-03

## 2023-02-03 NOTE — TELEPHONE ENCOUNTER
OK FOR HUB TO READ  BEAR patient via telephone for COLONOSCOPY. Scheduled 6/13/23 with arrival time of 730AM. Prep paperwork mailed to verified address on file. Patient advised arrival time may change based on [facility] guidelines. BEAR CM

## 2023-02-13 ENCOUNTER — OFFICE VISIT (OUTPATIENT)
Dept: INTERNAL MEDICINE | Age: 63
End: 2023-02-13
Payer: COMMERCIAL

## 2023-02-13 VITALS
BODY MASS INDEX: 26.68 KG/M2 | HEIGHT: 67 IN | OXYGEN SATURATION: 96 % | DIASTOLIC BLOOD PRESSURE: 78 MMHG | SYSTOLIC BLOOD PRESSURE: 118 MMHG | RESPIRATION RATE: 16 BRPM | WEIGHT: 170 LBS | HEART RATE: 62 BPM | TEMPERATURE: 97.3 F

## 2023-02-13 DIAGNOSIS — R41.89 SUBJECTIVE MEMORY COMPLAINTS: Primary | ICD-10-CM

## 2023-02-13 DIAGNOSIS — M62.838 MUSCLE SPASM: ICD-10-CM

## 2023-02-13 DIAGNOSIS — G43.109 MIGRAINE WITH AURA AND WITHOUT STATUS MIGRAINOSUS, NOT INTRACTABLE: ICD-10-CM

## 2023-02-13 PROCEDURE — 99214 OFFICE O/P EST MOD 30 MIN: CPT

## 2023-02-13 RX ORDER — CYCLOBENZAPRINE HCL 10 MG
10 TABLET ORAL NIGHTLY PRN
Qty: 30 TABLET | Refills: 0 | Status: SHIPPED | OUTPATIENT
Start: 2023-02-13

## 2023-02-13 RX ORDER — TIRZEPATIDE 7.5 MG/.5ML
7.5 INJECTION, SOLUTION SUBCUTANEOUS
COMMUNITY

## 2023-02-13 NOTE — PROGRESS NOTES
"    I N T E R N A L  M E D I C I N E  Sandy Pelletier, APRN    ENCOUNTER DATE:  02/13/2023    Sarah FARFAN House / 62 y.o. / female      CHIEF COMPLAINT / REASON FOR OFFICE VISIT     Memory Loss      ASSESSMENT & PLAN     Diagnoses and all orders for this visit:    1. Subjective memory complaints (Primary)  -     RPR  -     Vitamin B12  -     HIV-1 / O / 2 Ag / Antibody 4th Generation    2. Migraine with aura and without status migrainosus, not intractable  -     ubrogepant 100 MG tablet; Take 1 tablet by mouth 1 (One) Time As Needed (Headache) for up to 1 dose.  Dispense: 1 tablet; Refill: 0    3. Muscle spasm  -     cyclobenzaprine (FLEXERIL) 10 MG tablet; Take 1 tablet by mouth At Night As Needed for Muscle Spasms.  Dispense: 30 tablet; Refill: 0         SUMMARY/DISCUSSION  • Pt agreeable to trial Ubrelvy as migraine abortive.  She is agreeable to reach out to our office after trying medication to let us know if any benefit.    • Follow up with neurology as scheduled for vision complaints, headache and memory concerns.      • Visit ER for any worsening headache, numbness, tingling, weakness, new vision changes, eye pain, nausea, vomiting.        Next Appointment with me: Visit date not found    Return in about 3 months (around 5/13/2023) for Recheck.      VITAL SIGNS     Visit Vitals  /78 (BP Location: Right arm, Patient Position: Sitting, Cuff Size: Adult)   Pulse 62   Temp 97.3 °F (36.3 °C) (Temporal)   Resp 16   Ht 170.2 cm (67.01\")   Wt 77.1 kg (170 lb)   SpO2 96%   BMI 26.62 kg/m²             Wt Readings from Last 3 Encounters:   02/13/23 77.1 kg (170 lb)   01/12/23 78 kg (172 lb)   12/19/22 79.4 kg (175 lb)     Body mass index is 26.62 kg/m².        MEDICATIONS AT THE TIME OF OFFICE VISIT     Current Outpatient Medications on File Prior to Visit   Medication Sig Dispense Refill   • Alirocumab (Praluent) 75 MG/ML solution auto-injector Inject 1 mL under the skin into the appropriate area as directed Every 14 " "(Fourteen) Days. 2 pen 11   • atorvastatin (LIPITOR) 40 MG tablet Take 40 mg by mouth Daily.     • butalbital-acetaminophen  MG tablet tablet Take 1 tablet by mouth Every 8 (Eight) Hours As Needed (tension type headaches). 21 tablet 0   • Calcium Carbonate-Vit D-Min (CALCIUM 1200 PO)      • ibuprofen (ADVIL,MOTRIN) 200 MG tablet Take 200 mg by mouth Every 6 (Six) Hours As Needed for mild pain (1-3).     • Magnesium 400 MG capsule      • Tirzepatide (Mounjaro) 7.5 MG/0.5ML solution pen-injector 7.5 mg.     • vitamin B-12 (CYANOCOBALAMIN) 1000 MCG tablet Take 1,000 mcg by mouth Daily.     • [DISCONTINUED] rizatriptan (MAXALT) 5 MG tablet Take 1 tablet by mouth 1 (One) Time As Needed for Migraine for up to 1 dose. May repeat in 2 hours if needed 9 tablet 0   • Mounjaro 5 MG/0.5ML solution pen-injector INJECT 5 MG SUBCUTANEOUSLY ONCE WEEKLY.       No current facility-administered medications on file prior to visit.        HISTORY OF PRESENT ILLNESS     Here today as follow up to further investigate memory complaints.  She reports over the last 6 months she has experienced new difficulties with short and long term memory recall.  She reports difficulty focusing on her work tasks, and has difficulty following a task through to completion.  She reports a \"wandering mind.\"   has noticed some recent lapses in her memory as well.  Denies any anxiety or depression symptoms.  Work is stressful.  No family history of Alzheimer or dementia disease.    MOCA done at today's visit, with deficits found in naming (Tiger instead of Lion, and Hippo instead of Rhino), and delayed recall (1/5 words recalled at 5 minutes).      She has unremarkable CBC, CMP.  Normal TSH, vitamin B12, RPR, HIV.      She is scheduled to establish with neurology on March 27, 2023 to address ongoing vision complaints in left eye.  Since early December 2022, she has experienced a \"spot\" of blurred vision in her left eye that is constant and does " "not move.  She also reports a line, described as a \"wind shield wiper\" that is intermittent and moves throughout her line of vision.  Medical hx significant for migraines which she has treated successfully with ibuprofen for many decades.  She is currently experiencing one migraine a week, with aura consisting of flashing lights and lines.    She has had normal C-reactive protein, Sed rate.  Normal head CT.  MRI Brain showed mild probable sequelae of small vessel disease, but was otherwise essentially normal MRI brain.  She has tried Fioricet, Maxalt without any change to headaches.  She has declined MRA Brain at this time.      Medical history of past spinal surgery.  She reports new occurrence of left thoracic discomfort described as \"spasms\" occurring 2-3x weekly.  Symptoms are made worse when she rolls over in the bed.  Has ongoing bilateral lower extremity weakness following the surgery.  Denies numbness, tingling, bowel/ bladder changes, saddle anesthesia.      Patient Care Team:  Sandy Pelletier APRN as PCP - General (Family Medicine)  Roni Araujo MD as PCP - Family Medicine    REVIEW OF SYSTEMS     Review of Systems   Constitutional: Negative for chills, fever and unexpected weight change.   Eyes: Positive for visual disturbance. Negative for photophobia and pain.   Respiratory: Negative for cough, chest tightness and shortness of breath.    Cardiovascular: Negative for chest pain, palpitations and leg swelling.   Musculoskeletal: Positive for back pain (Chronic) and myalgias.   Neurological: Positive for headaches. Negative for dizziness, weakness and light-headedness.   Psychiatric/Behavioral: The patient is not nervous/anxious.           PHYSICAL EXAMINATION     Physical Exam  Vitals reviewed.   Constitutional:       General: She is not in acute distress.     Appearance: Normal appearance. She is not ill-appearing, toxic-appearing or diaphoretic.   HENT:      Head: Normocephalic and " atraumatic.   Eyes:      Extraocular Movements: Extraocular movements intact.      Conjunctiva/sclera: Conjunctivae normal.      Pupils: Pupils are equal, round, and reactive to light.   Cardiovascular:      Rate and Rhythm: Normal rate and regular rhythm.      Heart sounds: Normal heart sounds.   Pulmonary:      Effort: Pulmonary effort is normal.      Breath sounds: Normal breath sounds.   Musculoskeletal:      Right lower leg: No edema.      Left lower leg: No edema.   Neurological:      Mental Status: She is alert and oriented to person, place, and time. Mental status is at baseline.      Cranial Nerves: No cranial nerve deficit.      Sensory: No sensory deficit.      Motor: No weakness.      Gait: Gait normal.   Psychiatric:         Mood and Affect: Mood normal.         Behavior: Behavior normal.         Thought Content: Thought content normal.         Judgment: Judgment normal.           REVIEWED DATA     Labs:           Imaging:            Medical Tests:           Summary of old records / correspondence / consultant report:           Request outside records:

## 2023-02-14 DIAGNOSIS — R41.89 SUBJECTIVE MEMORY COMPLAINTS: Primary | ICD-10-CM

## 2023-02-14 LAB
HIV 1+2 AB+HIV1 P24 AG SERPL QL IA: NON REACTIVE
RPR SER QL: NORMAL
VIT B12 SERPL-MCNC: 1202 PG/ML (ref 211–946)

## 2023-02-20 ENCOUNTER — OFFICE VISIT (OUTPATIENT)
Dept: NEUROLOGY | Facility: CLINIC | Age: 63
End: 2023-02-20
Payer: COMMERCIAL

## 2023-02-20 VITALS
HEART RATE: 68 BPM | DIASTOLIC BLOOD PRESSURE: 72 MMHG | BODY MASS INDEX: 27 KG/M2 | OXYGEN SATURATION: 98 % | SYSTOLIC BLOOD PRESSURE: 126 MMHG | WEIGHT: 172 LBS | HEIGHT: 67 IN

## 2023-02-20 DIAGNOSIS — H53.9 VISUAL CHANGES: ICD-10-CM

## 2023-02-20 DIAGNOSIS — G43.719 INTRACTABLE CHRONIC MIGRAINE WITHOUT AURA AND WITHOUT STATUS MIGRAINOSUS: Primary | ICD-10-CM

## 2023-02-20 DIAGNOSIS — R51.9 CHRONIC DAILY HEADACHE: ICD-10-CM

## 2023-02-20 PROCEDURE — 99204 OFFICE O/P NEW MOD 45 MIN: CPT | Performed by: PSYCHIATRY & NEUROLOGY

## 2023-02-20 RX ORDER — POLYETHYLENE GLYCOL 3350 17 G/17G
17 POWDER, FOR SOLUTION ORAL DAILY
COMMUNITY

## 2023-02-20 RX ORDER — ASPIRIN 81 MG
100 TABLET, DELAYED RELEASE (ENTERIC COATED) ORAL 2 TIMES DAILY
COMMUNITY

## 2023-02-20 RX ORDER — METHYLPREDNISOLONE 4 MG/1
TABLET ORAL
Qty: 1 EACH | Refills: 0 | Status: SHIPPED | OUTPATIENT
Start: 2023-02-20

## 2023-02-20 RX ORDER — NAPROXEN 500 MG/1
500 TABLET ORAL 3 TIMES DAILY PRN
Qty: 60 TABLET | Refills: 4 | Status: SHIPPED | OUTPATIENT
Start: 2023-02-20 | End: 2023-03-22

## 2023-02-20 NOTE — PROGRESS NOTES
"Chief Complaint   Patient presents with   • Headache   • Blurred Vision       Patient ID: Sarah Borges is a 62 y.o. female.    HPI: I have had the pleasure of seeing your patient today.  As you may know she is a 62-year-old female here for the evaluation of headaches visual changes.  Patient says that for most of her adult life she has a history of migraines.  These headaches were noted by the onset of lashing lights in her visual fields or some type of circular spiral that would last about 30 minutes and resolve spontaneously.  This would then be followed by frontal headache.  She has 2 sisters also with migraine.  However in November of last year she says that she required a new eyeglass prescription.  Once she began using this prescription she says that she started to have a pain in her left eye.  She then noticed a \"windshield wiper\" type visual in the left eye.  She says that when she blinks may go away.  As well she is just looking straight ahead and is not there however if she looks to the left and then straight again it will occur only in that left I is a windshield wiping motion and then resolves.  She also has a spot in her vision that she says is blurry.  She does have chronic headaches.  She says that chronically she experiences frontal headache on a daily basis.  This is a pressure-like sensation typically.  She recently had an MRI of her brain which was within normal limits.  She says that with this windshield wiper type visualization she can have it with or without headache.  With her headaches she occasionally will experience some sensitivity to light and sound.  Typically no nausea or vomiting.  She says that she will have the visual change upon awakening as well is when she goes to bed at night.  It essentially is all throughout the day.  She does take several Advil doses per week.  She has seen an ophthalmologist who did not feel that the visual symptom was ocular in etiology.    The following " portions of the patient's history were reviewed and updated as appropriate: allergies, current medications, past family history, past medical history, past social history, past surgical history and problem list.    Review of Systems   HENT: Positive for tinnitus and voice change. Negative for sinus pressure.    Eyes: Positive for visual disturbance. Negative for photophobia and redness.   Musculoskeletal: Positive for back pain. Negative for arthralgias and gait problem.   Neurological: Positive for tremors, weakness and headaches. Negative for dizziness, seizures, syncope, facial asymmetry, speech difficulty, light-headedness and numbness.   Hematological: Bruises/bleeds easily.   Psychiatric/Behavioral: Positive for decreased concentration. Negative for agitation, behavioral problems, confusion, dysphoric mood, hallucinations, self-injury, sleep disturbance and suicidal ideas. The patient is not nervous/anxious and is not hyperactive.       I have reviewed the review of systems above performed by my medical assistant.      Vitals:    02/20/23 1247   BP: 126/72   Pulse: 68   SpO2: 98%       Neurologic Exam     Mental Status   Oriented to person, place, and time.   Registration: recalls 3 of 3 objects. Follows 3 step commands.   Attention: normal. Concentration: normal.   Speech: speech is normal   Level of consciousness: alert  Knowledge: consistent with education (No deficits found.).   Normal comprehension.     Cranial Nerves     CN II   Visual fields full to confrontation.     CN III, IV, VI   Pupils are equal, round, and reactive to light.  Extraocular motions are normal.   CN III: no CN III palsy  CN VI: no CN VI palsy  Nystagmus: none   Diplopia: none    CN V   Facial sensation intact.     CN VII   Facial expression full, symmetric.     CN VIII   CN VIII normal.     CN IX, X   CN IX normal.   CN X normal.     CN XI   CN XI normal.     CN XII   CN XII normal.     Motor Exam   Muscle bulk: normal  Right arm  tone: normal  Left arm tone: normal  Right leg tone: normal  Left leg tone: normal    Strength   Right neck flexion: 5/5  Left neck flexion: 5/5  Right neck extension: 5/5  Left neck extension: 5/5  Right deltoid: 5/5  Left deltoid: 5/5  Right biceps: 5/5  Left biceps: 5/5  Right triceps: 5/5  Left triceps: 5/5  Right wrist flexion: 5/5  Left wrist flexion: 5/5  Right wrist extension: 5/5  Left wrist extension: 5/5  Right interossei: 5/5  Left interossei: 5/5  Right abdominals: 5/5  Left abdominals: 5/5  Right iliopsoas: 5/5  Left iliopsoas: 5/5  Right quadriceps: 5/5  Left quadriceps: 5/5  Right hamstrin/5  Left hamstrin/5  Right glutei: 5/5  Left glutei: 5/5  Right anterior tibial: 5/5  Left anterior tibial: 5/5  Right posterior tibial: 5/5  Left posterior tibial: 5/5  Right peroneal: 5/5  Left peroneal: 5/5  Right gastroc: 5/5  Left gastroc: 5/5    Sensory Exam   Light touch normal.   Vibration normal.   Proprioception normal.   Pinprick normal.     Gait, Coordination, and Reflexes     Gait  Gait: normal    Coordination   Romberg: negative    Tremor   Resting tremor: absent  Intention tremor: absent    Reflexes   Right brachioradialis: 2+  Left brachioradialis: 2+  Right biceps: 2+  Left biceps: 2+  Right triceps: 2+  Left triceps: 2+  Right patellar: 2+  Left patellar: 2+  Right achilles: 2+  Left achilles: 2+  Right : 2+  Left : 2+Station is normal.       Physical Exam  Vitals reviewed.   Constitutional:       General: She is not in acute distress.     Appearance: She is well-developed.   HENT:      Head: Normocephalic and atraumatic.   Eyes:      Extraocular Movements: EOM normal.      Pupils: Pupils are equal, round, and reactive to light.   Cardiovascular:      Rate and Rhythm: Normal rate and regular rhythm.      Heart sounds: Normal heart sounds.   Pulmonary:      Effort: Pulmonary effort is normal. No respiratory distress.      Breath sounds: Normal breath sounds.   Abdominal:       General: Bowel sounds are normal. There is no distension.      Palpations: Abdomen is soft.      Tenderness: There is no abdominal tenderness.   Musculoskeletal:         General: No deformity.      Cervical back: Normal range of motion.   Skin:     General: Skin is warm.      Findings: No rash.   Neurological:      Mental Status: She is oriented to person, place, and time.      Coordination: Romberg Test normal.      Gait: Gait is intact.      Deep Tendon Reflexes:      Reflex Scores:       Tricep reflexes are 2+ on the right side and 2+ on the left side.       Bicep reflexes are 2+ on the right side and 2+ on the left side.       Brachioradialis reflexes are 2+ on the right side and 2+ on the left side.       Patellar reflexes are 2+ on the right side and 2+ on the left side.       Achilles reflexes are 2+ on the right side and 2+ on the left side.  Psychiatric:         Speech: Speech normal.         Judgment: Judgment normal.         Procedures    Assessment/Plan: She has a completely nonfocal neuro exam today.  Based on my evaluation she has no visual field defect.  I did suggest an MRI of the orbits however we have decided to hold off on doing that right now.  What I would like to do initially is to treat her chronic daily headache which is likely related to the chronic consumption of over-the-counter headache medication.  We will prescribe a Medrol Dosepak x1.  Also I would like for her to discontinue all over-the-counter headache medication.  We will just use naproxen for now abortively.  Would like to see how that does for her headache frequency and severity.  And as well make resolve some of these visual symptoms if they are migraine related.  Again we will consider an MRI of the orbits.       Diagnoses and all orders for this visit:    1. Intractable chronic migraine without aura and without status migrainosus (Primary)  -     methylPREDNISolone (MEDROL) 4 MG dose pack; Take as directed on package  instructions.  Dispense: 1 each; Refill: 0  -     naproxen (Naprosyn) 500 MG tablet; Take 1 tablet by mouth 3 (Three) Times a Day As Needed for Headache for up to 30 days.  Dispense: 60 tablet; Refill: 4    2. Chronic daily headache  -     methylPREDNISolone (MEDROL) 4 MG dose pack; Take as directed on package instructions.  Dispense: 1 each; Refill: 0  -     naproxen (Naprosyn) 500 MG tablet; Take 1 tablet by mouth 3 (Three) Times a Day As Needed for Headache for up to 30 days.  Dispense: 60 tablet; Refill: 4    3. Visual changes           Shivam Johnson II, MD

## 2023-03-03 ENCOUNTER — PATIENT ROUNDING (BHMG ONLY) (OUTPATIENT)
Dept: NEUROLOGY | Facility: CLINIC | Age: 63
End: 2023-03-03
Payer: COMMERCIAL

## 2023-03-06 RX ORDER — ATORVASTATIN CALCIUM 40 MG/1
TABLET, FILM COATED ORAL
Qty: 90 TABLET | Refills: 1 | Status: SHIPPED | OUTPATIENT
Start: 2023-03-06

## 2023-03-08 DIAGNOSIS — H53.9 VISION CHANGES: ICD-10-CM

## 2023-03-08 DIAGNOSIS — G43.109 MIGRAINE WITH AURA AND WITHOUT STATUS MIGRAINOSUS, NOT INTRACTABLE: Primary | ICD-10-CM

## 2023-04-19 NOTE — PROGRESS NOTES
RM:________     PCP: Sandy Pelletier APRN    : 1960  AGE: 62 y.o.  EST PATIENT   REASON FOR VISIT/  CC:    BP Readings from Last 3 Encounters:   23 126/72   23 118/78   23 124/80        WT: ____________ BP: __________L __________R HR______    CHEST PAIN: _____________    SOA: _____________PALPS: _______________     LIGHTHEADED: ___________FATIGUE: ________________ EDEMA __________    ALLERGIES:Patient has no known allergies. SMOKING HISTORY:  Social History     Tobacco Use   • Smoking status: Former     Packs/day: 1.00     Types: Cigarettes   • Smokeless tobacco: Never   Vaping Use   • Vaping Use: Never used   Substance Use Topics   • Alcohol use: Yes     Comment: social drinker   • Drug use: No     CAFFEINE USE_________________  ALCOHOL ______________________    Below is the patient's most recent value for Albumin, ALT, AST, BUN, Calcium, Chloride, Cholesterol, CO2, Creatinine, GFR, Glucose, HDL, Hematocrit, Hemoglobin, Hemoglobin A1C, LDL, Magnesium, Phosphorus, Platelets, Potassium, PSA, Sodium, Triglycerides, TSH and WBC.   Lab Results   Component Value Date    ALBUMIN 4.2 2022    ALT 11 2022    AST 20 2022    BUN 12 2022    CALCIUM 9.5 2022     2022    CHOL 153 2022    CO2 28 2022    CREATININE 0.71 2022    HDL 62 (H) 2022    HCT 42.5 2022    HGB 13.6 2022    HGBA1C 5.4 2022    LDL 72 2022     2022    K 3.8 2022     2022    TRIG 104 2022    TSH 3.050 2022    WBC 6.6 2022          NEW DIAGNOSIS/ SURGERY/ HOSP OR ED VISITS: ______________________    __________________________________________________________________      RECENT LABS OR DIAGNOSTIC TESTING:  _____________________________    __________________________________________________________________      ASSESSMENT/ PLAN:  _______________________________________________    __________________________________________________________________

## 2023-04-26 ENCOUNTER — OFFICE VISIT (OUTPATIENT)
Dept: CARDIOLOGY | Facility: CLINIC | Age: 63
End: 2023-04-26
Payer: COMMERCIAL

## 2023-04-26 VITALS
HEART RATE: 60 BPM | DIASTOLIC BLOOD PRESSURE: 80 MMHG | HEIGHT: 67 IN | SYSTOLIC BLOOD PRESSURE: 120 MMHG | WEIGHT: 165 LBS | BODY MASS INDEX: 25.9 KG/M2

## 2023-04-26 DIAGNOSIS — Z82.49 FAMILY HISTORY OF PREMATURE CORONARY ARTERY DISEASE: Primary | ICD-10-CM

## 2023-04-26 DIAGNOSIS — E78.01 FAMILIAL HYPERCHOLESTEROLEMIA: ICD-10-CM

## 2023-04-26 NOTE — PROGRESS NOTES
Date of Office Visit: 2023  Encounter Provider: Feliciano Cordova MD  Place of Service: [facility]  Patient Name: Sarah Borges  :1960    Chief Complaint   Patient presents with   • Hyperlipidemia     FAMILY HISTORY OF PREMATURE CAD      :     HPI: Sarah Borges is a 62 y.o. female who presents today to follow up. I have reviewed prior notes and there are no changes except for any new updates described below. I have also reviewed any information entered into the medical record by the patient or by ancillary staff.     She has a sister who has premature coronary artery disease and had a large anterior infarct. The patient has familial hyperlipidemia and I follow her for that. She had a CT calcium score in 2016 which was completely normal (score of zero).  She has not had any chest discomfort. She does have chronic venous insufficiency and has some lower extremity edema. She saw the vein care clinic who felt that it was not severe enough to require intervention unless she would prefer it for cosmetic reasons. She has decided to wait on that.  She is very active and is doing well. We have placed her on alirocumab with excellent results.    Past Medical History:   Diagnosis Date   • Agatston coronary artery calcium score less than 100     score was zero, 2016   • Cervical neuritis     C4-C5 of the left   • Chronic venous insufficiency    • Headache unknown    occasionally for many years   • Hyperlipidemia    • Lateral epicondylitis of right elbow    • Lumbar radiculopathy    • Neck pain    • Scoliosis unknown    surgery    • Visual impairment 3 or 4 weeks ago    blurred vision     Past Surgical History:   Procedure Laterality Date   • HYSTERECTOMY     • NECK SURGERY     • SPINE SURGERY      2 rods and 22 screws placed in spine   • TONSILLECTOMY     • TUBAL ABDOMINAL LIGATION     • VEIN LIGATION AND STRIPPING         Social History      Socioeconomic History   • Marital status:    Tobacco Use   • Smoking status: Former     Packs/day: 1.00     Types: Cigarettes   • Smokeless tobacco: Never   Vaping Use   • Vaping Use: Never used   Substance and Sexual Activity   • Alcohol use: Yes     Comment: social drinker   • Drug use: No   • Sexual activity: Yes     Partners: Male     Birth control/protection: Tubal ligation       Family History   Problem Relation Age of Onset   • Hypertension Mother    • Heart attack Mother         At an advanced age.   • Hyperlipidemia Mother    • Alcohol abuse Father    • Migraines Sister    • Heart attack Sister         At a young age.   • Cancer Maternal Grandmother         colon   • Breast cancer Maternal Aunt        Review of Systems   All other systems reviewed and are negative.      No Known Allergies      Current Outpatient Medications:   •  Alirocumab (Praluent) 75 MG/ML solution auto-injector, Inject 1 mL under the skin into the appropriate area as directed Every 14 (Fourteen) Days., Disp: 2 pen, Rfl: 11  •  atorvastatin (LIPITOR) 40 MG tablet, TAKE 1 TABLET BY MOUTH EVERY DAY, Disp: 90 tablet, Rfl: 1  •  Calcium Carbonate-Vit D-Min (CALCIUM 1200 PO), , Disp: , Rfl:   •  Docusate Sodium 100 MG capsule, Take 100 mg by mouth 2 (Two) Times a Day., Disp: , Rfl:   •  ibuprofen (ADVIL,MOTRIN) 200 MG tablet, Take 1 tablet by mouth Every 6 (Six) Hours As Needed for Mild Pain., Disp: , Rfl:   •  Magnesium 400 MG capsule, Daily., Disp: , Rfl:   •  polyethylene glycol (MIRALAX) 17 g packet, Take 17 g by mouth Daily., Disp: , Rfl:   •  Tirzepatide (Mounjaro) 7.5 MG/0.5ML solution pen-injector, 7.5 mg 1 (One) Time Per Week., Disp: , Rfl:   •  butalbital-acetaminophen  MG tablet tablet, Take 1 tablet by mouth Every 8 (Eight) Hours As Needed (tension type headaches). (Patient not taking: Reported on 4/26/2023), Disp: 21 tablet, Rfl: 0     Objective:     Vitals:    04/26/23 1454   BP: 120/80   BP Location: Left arm  "  Pulse: 60   Weight: 74.8 kg (165 lb)   Height: 170.2 cm (67.01\")     Body mass index is 25.83 kg/m².    Physical Exam  Vitals reviewed.   Constitutional:       General: She is not in acute distress.     Appearance: She is well-developed.   HENT:      Head: Normocephalic.      Nose: Nose normal.      Mouth/Throat:      Comments: masked  Eyes:      Conjunctiva/sclera: Conjunctivae normal.   Neck:      Vascular: No JVD.   Cardiovascular:      Rate and Rhythm: Normal rate and regular rhythm.      Pulses: Normal pulses and intact distal pulses.      Heart sounds: Normal heart sounds. No murmur heard.  Pulmonary:      Effort: Pulmonary effort is normal.      Breath sounds: Normal breath sounds.   Abdominal:      Palpations: Abdomen is soft.      Tenderness: There is no abdominal tenderness.   Musculoskeletal:         General: No swelling. Normal range of motion.      Cervical back: Normal range of motion.      Comments: Multiple varicosities, no edema   Skin:     General: Skin is warm and dry.      Findings: No rash.   Neurological:      General: No focal deficit present.      Mental Status: She is alert and oriented to person, place, and time.      Cranial Nerves: No cranial nerve deficit.   Psychiatric:         Mood and Affect: Mood normal.         Behavior: Behavior normal.         Thought Content: Thought content normal.           ECG 12 Lead    Date/Time: 4/26/2023 3:20 PM  Performed by: Feliciano Cordova MD  Authorized by: Feliciano Cordova MD   Comparison: compared with previous ECG   Similar to previous ECG  Rhythm: sinus rhythm  Conduction: conduction normal  ST Segments: ST segments normal  T Waves: T waves normal  QRS axis: normal  Other: no other findings    Clinical impression: normal ECG             Assessment:       Diagnosis Plan   1. Family history of premature coronary artery disease        2. Familial hypercholesterolemia           Plan:       She has a strong family history of premature CAD and she has " hyperlipidemia, but her CT calcium score in 2016 was zero. We will repeat this in 2026. She will remain on atorvastatin and alirocumab. She knows to call me if she has any issues in between visits.     Sincerely,       Feliciano Cordova MD

## 2023-05-15 ENCOUNTER — LAB (OUTPATIENT)
Dept: LAB | Facility: HOSPITAL | Age: 63
End: 2023-05-15
Payer: COMMERCIAL

## 2023-05-15 ENCOUNTER — OFFICE VISIT (OUTPATIENT)
Dept: INTERNAL MEDICINE | Age: 63
End: 2023-05-15
Payer: COMMERCIAL

## 2023-05-15 VITALS
WEIGHT: 164 LBS | TEMPERATURE: 97.8 F | BODY MASS INDEX: 25.74 KG/M2 | HEART RATE: 70 BPM | DIASTOLIC BLOOD PRESSURE: 76 MMHG | SYSTOLIC BLOOD PRESSURE: 124 MMHG | OXYGEN SATURATION: 99 % | HEIGHT: 67 IN

## 2023-05-15 DIAGNOSIS — G43.109 MIGRAINE WITH AURA AND WITHOUT STATUS MIGRAINOSUS, NOT INTRACTABLE: Primary | ICD-10-CM

## 2023-05-15 DIAGNOSIS — E78.00 HYPERCHOLESTEROLEMIA: ICD-10-CM

## 2023-05-15 PROCEDURE — 82607 VITAMIN B-12: CPT

## 2023-05-15 NOTE — PROGRESS NOTES
"    I N T E R N A L  M E D I C I N E  Sandy Pelletier, APRN    ENCOUNTER DATE:  05/15/2023    Sarah FARFAN House / 62 y.o. / female      CHIEF COMPLAINT / REASON FOR OFFICE VISIT     Hyperlipidemia      ASSESSMENT & PLAN     Diagnoses and all orders for this visit:    1. Migraine with aura and without status migrainosus, not intractable (Primary)  -     Rimegepant Sulfate (Nurtec) 75 MG tablet dispersible tablet; Take 1 tablet by mouth Daily As Needed (Take 1 tablet up to every 24 hours as needed for migraine, do not take more than 75 mg in 24 hours) for up to 8 doses.  Dispense: 8 tablet; Refill: 0    2. Hypercholesterolemia  -     Lipid Panel With / Chol / HDL Ratio; Future  -     Comprehensive Metabolic Panel; Future         SUMMARY/DISCUSSION  • Trial Nurtec for migraines.  She will reach out to our office with an update once she has tried.  Visit ER for any acutely worsening symptoms.  Follow up with optometry for repeat eye examination.  Follow up with neurology as scheduled.    • Discussed possibly topiramate for migraines but she would like to avoid any medication with potential side effects of drowsiness.    • Reassess pt's lipid panel late June 2023.  Order placed.        Next Appointment with me: Visit date not found    Return in about 8 months (around 1/13/2024) for Annual physical + lab only appt in July 2023.      VITAL SIGNS     Visit Vitals  /76   Pulse 70   Temp 97.8 °F (36.6 °C)   Ht 170.2 cm (67.01\")   Wt 74.4 kg (164 lb)   SpO2 99%   BMI 25.68 kg/m²             Wt Readings from Last 3 Encounters:   05/15/23 74.4 kg (164 lb)   04/26/23 74.8 kg (165 lb)   02/20/23 78 kg (172 lb)     Body mass index is 25.68 kg/m².        MEDICATIONS AT THE TIME OF OFFICE VISIT     Current Outpatient Medications on File Prior to Visit   Medication Sig Dispense Refill   • Alirocumab (Praluent) 75 MG/ML solution auto-injector Inject 1 mL under the skin into the appropriate area as directed Every 14 (Fourteen) Days. 2 " "pen 11   • atorvastatin (LIPITOR) 40 MG tablet TAKE 1 TABLET BY MOUTH EVERY DAY 90 tablet 1   • butalbital-acetaminophen  MG tablet tablet Take 1 tablet by mouth Every 8 (Eight) Hours As Needed (tension type headaches). 21 tablet 0   • Calcium Carbonate-Vit D-Min (CALCIUM 1200 PO)      • Docusate Sodium 100 MG capsule Take 100 mg by mouth 2 (Two) Times a Day.     • ibuprofen (ADVIL,MOTRIN) 200 MG tablet Take 1 tablet by mouth Every 6 (Six) Hours As Needed for Mild Pain.     • Magnesium 400 MG capsule Daily.     • polyethylene glycol (MIRALAX) 17 g packet Take 17 g by mouth Daily.     • Tirzepatide (Mounjaro) 7.5 MG/0.5ML solution pen-injector 7.5 mg 1 (One) Time Per Week.       No current facility-administered medications on file prior to visit.        HISTORY OF PRESENT ILLNESS     Here today as follow up for chronic daily headaches associated with vision changes. Since early December 2022, she has experienced a \"spot\" of blurred vision in her left eye that is constant and does not move.  She also reports seeing a line in her left eye, described as a \"windshield wiper\" that is intermittent and moves throughout her line of vision.  Daily ongoing headaches, with migraines occurring approximately once weekly.  Prior labs with normal C-reactive protein, Sed rate.  Normal head CT.  MRI Brain showed mild probable sequelae of small vessel disease, but was otherwise normal MRI brain.  She has had prior assessment by optometry as well as a neuro optometrist with normal findings.  She has currently declined MRA Brain/ MRI orbits.     She established with neurology, Dr. Johnson on February 20, 2023 and completed prescribed steroid dose pack without improvement in symptoms.  She hs now tried Ubrelvy, naproxen, ibuprofen, acetaminophen, rizatriptan, Fioricet without change in her symptoms.  She has now noticed left eye sensation of mild \"pressure\" while wearing distance glasses.  She is planning on seeking second optometry " evaluation.      HLD: June 2022 Lipid panel with LDL 72; triglycerides 104.    Patient Care Team:  Sandy Pelletier APRN as PCP - General (Family Medicine)  Roni Araujo MD as PCP - Family Medicine    REVIEW OF SYSTEMS     Review of Systems   Constitutional: Negative for chills, fever and unexpected weight change.   Eyes: Positive for visual disturbance. Negative for photophobia, pain and redness.   Respiratory: Negative for cough, chest tightness and shortness of breath.    Cardiovascular: Negative for chest pain, palpitations and leg swelling.   Neurological: Positive for headaches. Negative for dizziness, weakness and light-headedness.   Psychiatric/Behavioral: The patient is not nervous/anxious.           PHYSICAL EXAMINATION     Physical Exam  Vitals reviewed.   Constitutional:       General: She is not in acute distress.     Appearance: Normal appearance. She is not ill-appearing, toxic-appearing or diaphoretic.   HENT:      Head: Normocephalic and atraumatic.   Eyes:      General:         Left eye: No foreign body, discharge or hordeolum.      Extraocular Movements: Extraocular movements intact.      Left eye: Normal extraocular motion.      Conjunctiva/sclera:      Left eye: Left conjunctiva is not injected. No exudate or hemorrhage.     Pupils: Pupils are equal, round, and reactive to light.   Cardiovascular:      Rate and Rhythm: Normal rate and regular rhythm.      Heart sounds: Normal heart sounds.   Pulmonary:      Effort: Pulmonary effort is normal.      Breath sounds: Normal breath sounds.   Neurological:      Mental Status: She is alert and oriented to person, place, and time. Mental status is at baseline.      Cranial Nerves: No cranial nerve deficit.      Sensory: No sensory deficit.      Motor: No weakness.      Coordination: Coordination normal.      Gait: Gait normal.   Psychiatric:         Mood and Affect: Mood normal.         Behavior: Behavior normal.         Thought Content:  Thought content normal.         Judgment: Judgment normal.           REVIEWED DATA     Labs:           Imaging:            Medical Tests:           Summary of old records / correspondence / consultant report:           Request outside records:

## 2023-05-16 RX ORDER — RIMEGEPANT SULFATE 75 MG/75MG
75 TABLET, ORALLY DISINTEGRATING ORAL DAILY PRN
Qty: 8 TABLET | Refills: 0 | Status: SHIPPED | OUTPATIENT
Start: 2023-05-16

## 2023-05-22 ENCOUNTER — TELEPHONE (OUTPATIENT)
Dept: GASTROENTEROLOGY | Facility: CLINIC | Age: 63
End: 2023-05-22

## 2023-05-22 NOTE — TELEPHONE ENCOUNTER
Caller: Sarah Borges    Relationship to patient: Self    Best call back number: [phone]    Chief complaint: RESCHEDULE SCOPE    Type of visit: GASTRO PROCEDURE     If rescheduling, when is the original appointment: 06/13/23    Additional notes:PATIENT CALLING TO RESCHEDULE SCOPE, PLEASE REVIEW AND CONTACT PATIENT TO SCHEDULE.

## 2023-05-25 ENCOUNTER — TELEPHONE (OUTPATIENT)
Dept: NEUROLOGY | Facility: CLINIC | Age: 63
End: 2023-05-25
Payer: COMMERCIAL

## 2023-07-11 ENCOUNTER — TELEPHONE (OUTPATIENT)
Dept: INTERNAL MEDICINE | Age: 63
End: 2023-07-11

## 2023-07-11 NOTE — TELEPHONE ENCOUNTER
Caller: Sarah Borges    Relationship to patient: Self    Best call back number: [phone]    Type of visit: LABS    Requested date: 7/18 7:30 AM     If rescheduling, when is the original appointment:7/18

## 2023-07-17 ENCOUNTER — TELEPHONE (OUTPATIENT)
Dept: INTERNAL MEDICINE | Age: 63
End: 2023-07-17

## 2023-07-17 NOTE — TELEPHONE ENCOUNTER
Caller: Sarah Borges    Relationship to patient: Self    Best call back number: [phone]    Chief complaint:     Type of visit: LAB    Requested date:      If rescheduling, when is the original appointment:     Additional notes:PLEASE CANCEL LAB APPT IN OFFICE 7-18-23.   PATIENT ASKS TO HAVE LABS DRAWN AT HOSPITAL. PLEASE MAKE SURE ORDERS CAN BE ACCESSED  BY HOSPITAL

## 2023-07-24 RX ORDER — ALIROCUMAB 75 MG/ML
INJECTION, SOLUTION SUBCUTANEOUS
Qty: 6 ML | Refills: 3 | Status: SHIPPED | OUTPATIENT
Start: 2023-07-24 | End: 2023-07-25

## 2023-07-25 ENCOUNTER — TELEPHONE (OUTPATIENT)
Dept: CARDIOLOGY | Facility: CLINIC | Age: 63
End: 2023-07-25
Payer: COMMERCIAL

## 2023-07-25 NOTE — TELEPHONE ENCOUNTER
If insurance prefers Repatha, then discontinue Praluent.  I put the prescription in the chart.  Please send it to the pharmacy of her choice.  Thank you.

## 2023-08-24 NOTE — TELEPHONE ENCOUNTER
5/24 left vm, Brettt (if applicable) & mailed reminder regarding provider location   Composite Graft Text: The defect edges were debeveled with a #15 scalpel blade.  Given the location of the defect, shape of the defect, the proximity to free margins and the fact the defect was full thickness a composite graft was deemed most appropriate.  The defect was outline and then transferred to the donor site.  A full thickness graft was then excised from the donor site. The graft was then placed in the primary defect, oriented appropriately and then sutured into place.  The secondary defect was then repaired using a primary closure.

## 2023-08-29 ENCOUNTER — TELEPHONE (OUTPATIENT)
Dept: CARDIOLOGY | Facility: CLINIC | Age: 63
End: 2023-08-29
Payer: COMMERCIAL

## 2023-09-01 RX ORDER — ATORVASTATIN CALCIUM 40 MG/1
TABLET, FILM COATED ORAL
Qty: 90 TABLET | Refills: 0 | Status: SHIPPED | OUTPATIENT
Start: 2023-09-01

## 2023-09-11 DIAGNOSIS — E78.00 HYPERCHOLESTEROLEMIA: Primary | ICD-10-CM

## 2023-09-12 NOTE — TELEPHONE ENCOUNTER
Called pt to schedule lab appt per provider, pt stated she will have them drawn at the hospital, she says she has a lot going on but will try to have them drawn soon.

## 2023-09-20 ENCOUNTER — LAB (OUTPATIENT)
Dept: LAB | Facility: HOSPITAL | Age: 63
End: 2023-09-20
Payer: COMMERCIAL

## 2023-09-20 PROCEDURE — 80061 LIPID PANEL: CPT

## 2023-09-20 PROCEDURE — 80053 COMPREHEN METABOLIC PANEL: CPT

## 2023-10-24 ENCOUNTER — TELEPHONE (OUTPATIENT)
Dept: CARDIOLOGY | Facility: CLINIC | Age: 63
End: 2023-10-24
Payer: COMMERCIAL

## 2023-10-24 NOTE — TELEPHONE ENCOUNTER
Caller: Sarah Borges     Relationship: SELF    Best call back number:-[phone]    What is your medical concern? REPATHA IS INJECTABLE AND IS NOW $40 EVERY 2 WEEKS. SHE HAS GONE BACK AND FORTH FROM REPATHA TO PRAULENT AND NOW WILL ONLY COVER THE REPATHA.PATIENT WOULD LIKE TO KNOW IF SHE NEEDS TO STAY ON THIS? OR IF THERE IS SOMETHING EASIER OR CHEAPER TO REPLACE THIS.     How long has this issue been going on? JUST CHANGED TO MAKE HARDER TO RECEIVE AND MORE EXPENSIVE

## 2023-10-25 NOTE — TELEPHONE ENCOUNTER
Called and discussed with pt, I informed her that back in July our office received a faxed notification stating that the insurance company will no longer cover Praluent and would need to switch to Repatha.      Pt is now paying $40 every 2 weeks to cover the cost of the Repatha.  She woul d like to know since her last lipid panel was in a appropriate range if she can discontinue at this time to see if it will go back up.

## 2023-10-31 NOTE — TELEPHONE ENCOUNTER
Called and discussed with pt, she is agreeable to continue with the medication once a month.  Will send in a new Rx.

## 2023-11-30 NOTE — TELEPHONE ENCOUNTER
Jacqueline Denise is a 19 y.o. female who presents with a chief complaint of Follow-up      SUBJECTIVE  Patient presents to go over her medication follow-up for her PCOS.  She is currently on birth control on metformin and a year keeping her next regular.  She has a lot of questions about PCOS medications and how long she needs to be on them.  She was notified that she have to stay on the metformin and she would come off the birth control when she was trying to get pregnant.  She could try to go off the birth control even now but if she started getting irregular she would have to go back on it.  She and I discussed this at length    History reviewed. No pertinent past medical history.  History reviewed. No pertinent surgical history.  Social History     Socioeconomic History    Marital status: Single     Spouse name: None    Number of children: None    Years of education: None    Highest education level: None   Occupational History    None   Tobacco Use    Smoking status: Never    Smokeless tobacco: Never   Vaping Use    Vaping Use: Never used   Substance and Sexual Activity    Alcohol use: Not Currently    Drug use: Not Currently    Sexual activity: Defer   Other Topics Concern    None   Social History Narrative    None     Social Determinants of Health     Financial Resource Strain: Not on file   Food Insecurity: Not on file   Transportation Needs: Not on file   Physical Activity: Not on file   Stress: Not on file   Social Connections: Not on file   Intimate Partner Violence: Not on file   Housing Stability: Not on file     No family history on file.    OB History   No obstetric history on file.       OBJECTIVE  No Known Allergies   (Not in a hospital admission)       Review of Systems  History obtained from the patient  General ROS: negative  Psychological ROS: negative  Gastrointestinal ROS: no abdominal pain, change in bowel habits, or black or bloody stools  Musculoskeletal ROS: negative  Physical Exam  General  PA has been approved from 12/29/22 - 12/29/23.  Called and informed pt.     "Appearance: awake, alert, oriented, in no acute distress, well developed, well nourished, and in no acute distress  Skin: there are no suspicious lesions or rashes of concern, skin color, texture, turgor are normal; there are no bruises, rashes or lesions.  Head/Face: NCAT  Eyes: No gross abnormalities., PERRL, and EOMI  Abdomen: Soft, non-tender, normal bowel sounds; no bruits, organomegaly or masses.  Extremities: Extremities warm to touch, pink, with no edema.  Musculoskeletal: negative    /58   Ht 1.53 m (5' 0.24\")   Wt 49 kg (108 lb)   LMP 11/24/2023 (Exact Date)   BMI 20.93 kg/m²    Problem List Items Addressed This Visit    None  Visit Diagnoses       PCOS (polycystic ovarian syndrome)    -  Primary           Follow up in 1 yr    "

## 2024-03-10 RX ORDER — ATORVASTATIN CALCIUM 40 MG/1
TABLET, FILM COATED ORAL
Qty: 90 TABLET | Refills: 0 | Status: SHIPPED | OUTPATIENT
Start: 2024-03-10

## 2024-03-14 RX ORDER — EVOLOCUMAB 140 MG/ML
INJECTION, SOLUTION SUBCUTANEOUS
Qty: 3 EACH | Refills: 1 | Status: SHIPPED | OUTPATIENT
Start: 2024-03-14

## 2024-03-19 ENCOUNTER — TELEPHONE (OUTPATIENT)
Dept: CARDIOLOGY | Facility: CLINIC | Age: 64
End: 2024-03-19
Payer: COMMERCIAL

## 2024-06-07 RX ORDER — ATORVASTATIN CALCIUM 40 MG/1
TABLET, FILM COATED ORAL
Qty: 30 TABLET | Refills: 1 | Status: SHIPPED | OUTPATIENT
Start: 2024-06-07

## 2024-07-05 RX ORDER — ATORVASTATIN CALCIUM 40 MG/1
40 TABLET, FILM COATED ORAL DAILY
Qty: 90 TABLET | Refills: 0 | Status: SHIPPED | OUTPATIENT
Start: 2024-07-05

## 2024-08-07 ENCOUNTER — OFFICE VISIT (OUTPATIENT)
Dept: INTERNAL MEDICINE | Age: 64
End: 2024-08-07
Payer: COMMERCIAL

## 2024-08-07 VITALS
HEART RATE: 78 BPM | HEIGHT: 67 IN | WEIGHT: 169 LBS | SYSTOLIC BLOOD PRESSURE: 122 MMHG | DIASTOLIC BLOOD PRESSURE: 70 MMHG | OXYGEN SATURATION: 98 % | BODY MASS INDEX: 26.53 KG/M2

## 2024-08-07 DIAGNOSIS — J20.8 ACUTE BACTERIAL BRONCHITIS: Primary | ICD-10-CM

## 2024-08-07 DIAGNOSIS — B96.89 ACUTE BACTERIAL BRONCHITIS: Primary | ICD-10-CM

## 2024-08-07 PROCEDURE — 99213 OFFICE O/P EST LOW 20 MIN: CPT

## 2024-08-07 RX ORDER — AZITHROMYCIN 250 MG/1
TABLET, FILM COATED ORAL
Qty: 6 TABLET | Refills: 0 | Status: SHIPPED | OUTPATIENT
Start: 2024-08-07

## 2024-08-07 RX ORDER — BENZONATATE 200 MG/1
200 CAPSULE ORAL 3 TIMES DAILY PRN
Qty: 30 CAPSULE | Refills: 0 | Status: SHIPPED | OUTPATIENT
Start: 2024-08-07

## 2024-08-07 NOTE — PROGRESS NOTES
"    I N T E R N A L  M E D I C I N E  Sandy Prabhu, ANA    ENCOUNTER DATE:  08/07/2024    Sarah Borges / 63 y.o. / female      CHIEF COMPLAINT / REASON FOR OFFICE VISIT     Cough (Dry - very productive this morning - goes back and forth - x10 days), Sore Throat, and Fatigue      ASSESSMENT & PLAN     Diagnoses and all orders for this visit:    1. Acute bacterial bronchitis (Primary)  -     azithromycin (Zithromax Z-Sean) 250 MG tablet; Take 2 tablets by mouth on day 1, then 1 tablet daily on days 2-5  Dispense: 6 tablet; Refill: 0  -     benzonatate (TESSALON) 200 MG capsule; Take 1 capsule by mouth 3 (Three) Times a Day As Needed for Cough.  Dispense: 30 capsule; Refill: 0  -     CBC & Differential  -     Comprehensive Metabolic Panel         SUMMARY/DISCUSSION  Suspect bacterial bronchitis and will treat with Z Sean, tessalon PRN. She declines Chest XR evaluation to rule out PNA.  She would like to proceed with laboratory evaluation.  Stop Delsym; recommend Mucinex DM OTC.  Aware to visit ER for any acutely worsening symptoms.        Next Appointment with me: Visit date not found    Return in about 6 weeks (around 9/18/2024) for Annual physical.      VITAL SIGNS     Visit Vitals  /70 (BP Location: Right arm, Patient Position: Sitting, Cuff Size: Adult)   Pulse 78   Ht 170.2 cm (67.01\")   Wt 76.7 kg (169 lb)   SpO2 98%   BMI 26.46 kg/m²             Wt Readings from Last 3 Encounters:   08/07/24 76.7 kg (169 lb)   05/15/23 74.4 kg (164 lb)   04/26/23 74.8 kg (165 lb)     Body mass index is 26.46 kg/m².        MEDICATIONS AT THE TIME OF OFFICE VISIT     Current Outpatient Medications on File Prior to Visit   Medication Sig Dispense Refill    atorvastatin (LIPITOR) 40 MG tablet Take 1 tablet by mouth Daily. 90 tablet 0    Calcium Carbonate-Vit D-Min (CALCIUM 1200 PO)       Docusate Sodium 100 MG capsule Take 1 tablet by mouth 2 (Two) Times a Day.      ibuprofen (ADVIL,MOTRIN) 200 MG tablet Take 1 tablet by mouth " Every 6 (Six) Hours As Needed for Mild Pain.      Magnesium 400 MG capsule Daily.      polyethylene glycol (MIRALAX) 17 g packet Take 17 g by mouth Daily.      Repatha solution prefilled syringe injection INJECT 1 ML UNDER THE SKIN INTO THE APPROPRIATE AREA AS DIRECTED EVERY 30 (THIRTY) DAYS. 3 each 1    Tirzepatide (Mounjaro) 7.5 MG/0.5ML solution pen-injector 0.5 mL 1 (One) Time Per Week.      [DISCONTINUED] butalbital-acetaminophen  MG tablet tablet Take 1 tablet by mouth Every 8 (Eight) Hours As Needed (tension type headaches). (Patient not taking: Reported on 8/7/2024) 21 tablet 0    [DISCONTINUED] Rimegepant Sulfate (Nurtec) 75 MG tablet dispersible tablet Take 1 tablet by mouth Daily As Needed (Take 1 tablet up to every 24 hours as needed for migraine, do not take more than 75 mg in 24 hours) for up to 8 doses. 8 tablet 0     No current facility-administered medications on file prior to visit.        HISTORY OF PRESENT ILLNESS     Symptoms started 10 days ago with mild sore throat which has since resolved.  Now with fatigue, some lightheadedness (does have history of vertigo), intermittently productive cough with green sputum.  No fever, chills, headache, sinus pressure/ pain, chest pain, tightness, shortness of breath.  Eating, drinking, urinating well.  Tested earlier this week for COVID-19 using at home test, negative.  Using Delsym cough syrup OTC without benefit.          Patient Care Team:  Sandy Pelletier APRN as PCP - General (Family Medicine)  Roni Araujo MD as PCP - Family Medicine    REVIEW OF SYSTEMS     Review of Systems   Constitutional:  Positive for fatigue. Negative for chills, fever and unexpected weight change.   HENT:  Positive for sore throat. Negative for congestion, ear pain, rhinorrhea, sinus pressure and sinus pain.    Respiratory:  Positive for cough. Negative for chest tightness and shortness of breath.    Cardiovascular:  Negative for chest pain, palpitations and  leg swelling.   Neurological:  Positive for light-headedness. Negative for dizziness, weakness and headaches.   Psychiatric/Behavioral:  The patient is not nervous/anxious.           PHYSICAL EXAMINATION     Physical Exam  Vitals reviewed.   Constitutional:       General: She is not in acute distress.     Appearance: Normal appearance. She is not ill-appearing, toxic-appearing or diaphoretic.   HENT:      Head: Normocephalic and atraumatic.      Right Ear: Tympanic membrane, ear canal and external ear normal. There is no impacted cerumen.      Left Ear: Tympanic membrane, ear canal and external ear normal. There is no impacted cerumen.      Nose: Nose normal. No congestion or rhinorrhea.      Mouth/Throat:      Mouth: Mucous membranes are moist.      Pharynx: Oropharynx is clear. No oropharyngeal exudate or posterior oropharyngeal erythema.   Cardiovascular:      Rate and Rhythm: Normal rate and regular rhythm.      Heart sounds: Normal heart sounds.   Pulmonary:      Effort: Pulmonary effort is normal.      Breath sounds: Normal breath sounds. No wheezing, rhonchi or rales.   Musculoskeletal:      Right lower leg: No edema.      Left lower leg: No edema.   Lymphadenopathy:      Cervical: No cervical adenopathy.   Neurological:      Mental Status: She is alert and oriented to person, place, and time. Mental status is at baseline.   Psychiatric:         Mood and Affect: Mood normal.         Behavior: Behavior normal.         Thought Content: Thought content normal.         Judgment: Judgment normal.           REVIEWED DATA     Labs:           Imaging:            Medical Tests:           Summary of old records / correspondence / consultant report:           Request outside records:

## 2024-08-08 LAB
ALBUMIN SERPL-MCNC: 3.9 G/DL (ref 3.5–5.2)
ALBUMIN/GLOB SERPL: 2.1 G/DL
ALP SERPL-CCNC: 104 U/L (ref 39–117)
ALT SERPL-CCNC: 16 U/L (ref 1–33)
AST SERPL-CCNC: 22 U/L (ref 1–32)
BASOPHILS # BLD AUTO: 0.03 10*3/MM3 (ref 0–0.2)
BASOPHILS NFR BLD AUTO: 0.5 % (ref 0–1.5)
BILIRUB SERPL-MCNC: 0.4 MG/DL (ref 0–1.2)
BUN SERPL-MCNC: 13 MG/DL (ref 8–23)
BUN/CREAT SERPL: 18.8 (ref 7–25)
CALCIUM SERPL-MCNC: 9.5 MG/DL (ref 8.6–10.5)
CHLORIDE SERPL-SCNC: 99 MMOL/L (ref 98–107)
CO2 SERPL-SCNC: 31.5 MMOL/L (ref 22–29)
CREAT SERPL-MCNC: 0.69 MG/DL (ref 0.57–1)
EGFRCR SERPLBLD CKD-EPI 2021: 97.7 ML/MIN/1.73
EOSINOPHIL # BLD AUTO: 0.19 10*3/MM3 (ref 0–0.4)
EOSINOPHIL NFR BLD AUTO: 3.2 % (ref 0.3–6.2)
ERYTHROCYTE [DISTWIDTH] IN BLOOD BY AUTOMATED COUNT: 11.7 % (ref 12.3–15.4)
GLOBULIN SER CALC-MCNC: 1.9 GM/DL
GLUCOSE SERPL-MCNC: 97 MG/DL (ref 65–99)
HCT VFR BLD AUTO: 37.5 % (ref 34–46.6)
HGB BLD-MCNC: 12.4 G/DL (ref 12–15.9)
IMM GRANULOCYTES # BLD AUTO: 0.01 10*3/MM3 (ref 0–0.05)
IMM GRANULOCYTES NFR BLD AUTO: 0.2 % (ref 0–0.5)
LYMPHOCYTES # BLD AUTO: 1.34 10*3/MM3 (ref 0.7–3.1)
LYMPHOCYTES NFR BLD AUTO: 22.3 % (ref 19.6–45.3)
MCH RBC QN AUTO: 28.3 PG (ref 26.6–33)
MCHC RBC AUTO-ENTMCNC: 33.1 G/DL (ref 31.5–35.7)
MCV RBC AUTO: 85.6 FL (ref 79–97)
MONOCYTES # BLD AUTO: 0.76 10*3/MM3 (ref 0.1–0.9)
MONOCYTES NFR BLD AUTO: 12.6 % (ref 5–12)
NEUTROPHILS # BLD AUTO: 3.68 10*3/MM3 (ref 1.7–7)
NEUTROPHILS NFR BLD AUTO: 61.2 % (ref 42.7–76)
NRBC BLD AUTO-RTO: 0 /100 WBC (ref 0–0.2)
PLATELET # BLD AUTO: 216 10*3/MM3 (ref 140–450)
POTASSIUM SERPL-SCNC: 4.5 MMOL/L (ref 3.5–5.2)
PROT SERPL-MCNC: 5.8 G/DL (ref 6–8.5)
RBC # BLD AUTO: 4.38 10*6/MM3 (ref 3.77–5.28)
SODIUM SERPL-SCNC: 138 MMOL/L (ref 136–145)
WBC # BLD AUTO: 6.01 10*3/MM3 (ref 3.4–10.8)

## 2024-08-16 ENCOUNTER — TELEPHONE (OUTPATIENT)
Dept: GASTROENTEROLOGY | Facility: CLINIC | Age: 64
End: 2024-08-16
Payer: COMMERCIAL

## 2024-08-16 NOTE — TELEPHONE ENCOUNTER
Caller: Sarah Borges    Relationship to patient: Self    Best call back number: [phone]    Chief complaint:     Type of visit: CLS    Requested date: PLEASE CALL TO RESCHEDULE     If rescheduling, when is the original appointment: 6/13/24     Additional notes:

## 2024-08-16 NOTE — PROGRESS NOTES
RM:________     PCP: Sandy Pelletier APRN    : 1960  AGE: 63 y.o.  EST PATIENT     REASON FOR VISIT/  CC:        BP Readings from Last 3 Encounters:   24 122/70   05/15/23 124/76   23 120/80      Wt Readings from Last 3 Encounters:   24 76.7 kg (169 lb)   05/15/23 74.4 kg (164 lb)   23 74.8 kg (165 lb)        WT: ____________ BP: __________L __________R HR______    CHEST PAIN: _____________    SOA: _____________PALPS: _______________     LIGHTHEADED: ___________FATIGUE: ________________ EDEMA __________    ALLERGIES:Patient has no known allergies. SMOKING HISTORY:  Social History     Tobacco Use    Smoking status: Former     Current packs/day: 1.00     Types: Cigarettes    Smokeless tobacco: Never   Vaping Use    Vaping status: Never Used   Substance Use Topics    Alcohol use: Yes     Comment: social drinker    Drug use: No     CAFFEINE USE_________________  ALCOHOL ______________________

## 2024-08-20 ENCOUNTER — PREP FOR SURGERY (OUTPATIENT)
Dept: OTHER | Facility: HOSPITAL | Age: 64
End: 2024-08-20
Payer: COMMERCIAL

## 2024-08-20 DIAGNOSIS — Z12.11 ENCOUNTER FOR SCREENING FOR MALIGNANT NEOPLASM OF COLON: Primary | ICD-10-CM

## 2024-08-20 DIAGNOSIS — Z80.0 FAMILY HISTORY OF MALIGNANT NEOPLASM OF COLON: ICD-10-CM

## 2024-08-21 PROBLEM — Z80.0 FAMILY HISTORY OF MALIGNANT NEOPLASM OF COLON: Status: ACTIVE | Noted: 2024-08-20

## 2024-08-21 PROBLEM — Z12.11 ENCOUNTER FOR SCREENING FOR MALIGNANT NEOPLASM OF COLON: Status: ACTIVE | Noted: 2024-08-20

## 2024-08-22 ENCOUNTER — OFFICE VISIT (OUTPATIENT)
Dept: CARDIOLOGY | Facility: CLINIC | Age: 64
End: 2024-08-22
Payer: COMMERCIAL

## 2024-08-22 VITALS
HEIGHT: 67 IN | DIASTOLIC BLOOD PRESSURE: 78 MMHG | HEART RATE: 64 BPM | SYSTOLIC BLOOD PRESSURE: 124 MMHG | OXYGEN SATURATION: 100 % | WEIGHT: 168.6 LBS | BODY MASS INDEX: 26.46 KG/M2

## 2024-08-22 DIAGNOSIS — Z82.49 FAMILY HISTORY OF PREMATURE CORONARY ARTERY DISEASE: ICD-10-CM

## 2024-08-22 DIAGNOSIS — E78.01 FAMILIAL HYPERCHOLESTEROLEMIA: Primary | ICD-10-CM

## 2024-08-22 PROCEDURE — 93000 ELECTROCARDIOGRAM COMPLETE: CPT | Performed by: INTERNAL MEDICINE

## 2024-08-22 PROCEDURE — 99213 OFFICE O/P EST LOW 20 MIN: CPT | Performed by: INTERNAL MEDICINE

## 2024-08-22 NOTE — PROGRESS NOTES
Date of Office Visit: 2024  Encounter Provider: Feliciano Cordova MD  Place of Service: [facility] CARDIOLOGY  Patient Name: Sarah Borges  :1960    Chief Complaint   Patient presents with    Follow-up   :     HPI: Sarah Borges is a 63 y.o. female who presents today in follow up. I have reviewed prior notes and there are no changes except for any new updates described below. I have also reviewed any information entered into the medical record by the patient or by ancillary staff.     She has familial hyperlipidemia and I follow her for that. She's on evolocumab. She has a sister who has premature coronary artery disease. She had a CT calcium score in 2016 which was completely normal (score of zero).  She has not had any chest discomfort. She does have chronic venous insufficiency and has some lower extremity edema. She saw the vein care clinic who felt that it was not severe enough to require intervention unless she would prefer it for cosmetic reasons. She has decided to wait on that.  She is very active and is doing well.     Past Medical History:   Diagnosis Date    Agatston coronary artery calcium score less than 100     score was zero, 2016    Cervical neuritis     C4-C5 of the left    Chronic venous insufficiency     Headache unknown    occasionally for many years    Hyperlipidemia     Lateral epicondylitis of right elbow     Lumbar radiculopathy     Neck pain     Scoliosis unknown    surgery     Visual impairment 3 or 4 weeks ago    blurred vision     Past Surgical History:   Procedure Laterality Date    HYSTERECTOMY      NECK SURGERY      SPINE SURGERY  2019    2 rods and 22 screws placed in spine    TONSILLECTOMY  1972    TUBAL ABDOMINAL LIGATION  1993    VEIN LIGATION AND STRIPPING         Social History     Socioeconomic History    Marital status:    Tobacco Use    Smoking status: Former     Current packs/day: 1.00     Types:  "Cigarettes    Smokeless tobacco: Never   Vaping Use    Vaping status: Never Used   Substance and Sexual Activity    Alcohol use: Yes     Comment: social drinker    Drug use: No    Sexual activity: Yes     Partners: Male     Birth control/protection: Tubal ligation       Family History   Problem Relation Age of Onset    Hypertension Mother     Heart attack Mother         At an advanced age.    Hyperlipidemia Mother     Alcohol abuse Father     Migraines Sister     Heart attack Sister         At a young age.    Cancer Maternal Grandmother         colon    Breast cancer Maternal Aunt        Review of Systems   All other systems reviewed and are negative.      No Known Allergies      Current Outpatient Medications:     atorvastatin (LIPITOR) 40 MG tablet, Take 1 tablet by mouth Daily., Disp: 90 tablet, Rfl: 0    Calcium Carbonate-Vit D-Min (CALCIUM 1200 PO), , Disp: , Rfl:     Docusate Sodium 100 MG capsule, Take 1 tablet by mouth 2 (Two) Times a Day., Disp: , Rfl:     ibuprofen (ADVIL,MOTRIN) 200 MG tablet, Take 1 tablet by mouth Every 6 (Six) Hours As Needed for Mild Pain., Disp: , Rfl:     Magnesium 400 MG capsule, Daily., Disp: , Rfl:     polyethylene glycol (MIRALAX) 17 g packet, Take 17 g by mouth Daily., Disp: , Rfl:     Repatha solution prefilled syringe injection, INJECT 1 ML UNDER THE SKIN INTO THE APPROPRIATE AREA AS DIRECTED EVERY 30 (THIRTY) DAYS., Disp: 3 each, Rfl: 1    Tirzepatide (Mounjaro) 7.5 MG/0.5ML solution pen-injector, 0.5 mL 1 (One) Time Per Week., Disp: , Rfl:      Objective:     Vitals:    08/22/24 0901   BP: 124/78   BP Location: Left arm   Patient Position: Sitting   Cuff Size: Adult   Pulse: 64   SpO2: 100%   Weight: 76.5 kg (168 lb 9.6 oz)   Height: 170.2 cm (67.01\")     Body mass index is 26.4 kg/m².    Physical Exam  Vitals reviewed.   Constitutional:       General: She is not in acute distress.     Appearance: She is well-developed.   HENT:      Head: Normocephalic.      Nose: Nose " normal.   Eyes:      Conjunctiva/sclera: Conjunctivae normal.   Neck:      Vascular: No JVD.   Cardiovascular:      Rate and Rhythm: Normal rate and regular rhythm.      Pulses: Normal pulses and intact distal pulses.      Heart sounds: Normal heart sounds. No murmur heard.  Pulmonary:      Effort: Pulmonary effort is normal.      Breath sounds: Normal breath sounds.   Abdominal:      Palpations: Abdomen is soft.      Tenderness: There is no abdominal tenderness.   Musculoskeletal:         General: No swelling. Normal range of motion.      Cervical back: Normal range of motion.      Comments: Multiple varicosities, no edema   Skin:     General: Skin is warm and dry.   Neurological:      General: No focal deficit present.      Mental Status: She is alert.   Psychiatric:         Mood and Affect: Mood normal.           ECG 12 Lead    Date/Time: 8/22/2024 9:11 AM  Performed by: Feliciano Cordova MD    Authorized by: Feliciano Cordova MD  Comparison: compared with previous ECG   Similar to previous ECG  Rhythm: sinus rhythm  Conduction: conduction normal  ST Segments: ST segments normal  T Waves: T waves normal  QRS axis: normal  Other: no other findings    Clinical impression: normal ECG           Assessment:       Diagnosis Plan   1. Familial hypercholesterolemia        2. Family history of premature coronary artery disease           Plan:       She has a strong family history of premature CAD and she has hyperlipidemia, but her CT calcium score in 2016 was zero. We will repeat this in 2026. She will remain on atorvastatin and evolocumab. She knows to call me if she has any issues in between visits.     Sincerely,       Feliciano Cordova MD

## 2024-09-19 ENCOUNTER — OFFICE VISIT (OUTPATIENT)
Dept: INTERNAL MEDICINE | Age: 64
End: 2024-09-19
Payer: COMMERCIAL

## 2024-09-19 VITALS
HEART RATE: 72 BPM | OXYGEN SATURATION: 100 % | HEIGHT: 67 IN | WEIGHT: 168 LBS | DIASTOLIC BLOOD PRESSURE: 72 MMHG | SYSTOLIC BLOOD PRESSURE: 126 MMHG | BODY MASS INDEX: 26.37 KG/M2

## 2024-09-19 DIAGNOSIS — E55.9 VITAMIN D DEFICIENCY: ICD-10-CM

## 2024-09-19 DIAGNOSIS — E78.01 FAMILIAL HYPERCHOLESTEROLEMIA: ICD-10-CM

## 2024-09-19 DIAGNOSIS — Z87.891 HISTORY OF SMOKING: ICD-10-CM

## 2024-09-19 DIAGNOSIS — Z00.00 ANNUAL PHYSICAL EXAM: Primary | ICD-10-CM

## 2024-09-19 DIAGNOSIS — Z11.59 NEED FOR HEPATITIS C SCREENING TEST: ICD-10-CM

## 2024-09-19 PROCEDURE — 99396 PREV VISIT EST AGE 40-64: CPT

## 2024-09-20 ENCOUNTER — TELEPHONE (OUTPATIENT)
Dept: INTERNAL MEDICINE | Age: 64
End: 2024-09-20
Payer: COMMERCIAL

## 2024-09-23 RX ORDER — EVOLOCUMAB 140 MG/ML
INJECTION, SOLUTION SUBCUTANEOUS
Qty: 3 EACH | Refills: 1 | Status: SHIPPED | OUTPATIENT
Start: 2024-09-23

## 2024-09-25 ENCOUNTER — OFFICE VISIT (OUTPATIENT)
Dept: INTERNAL MEDICINE | Age: 64
End: 2024-09-25
Payer: COMMERCIAL

## 2024-09-25 VITALS
OXYGEN SATURATION: 100 % | HEART RATE: 55 BPM | WEIGHT: 169.4 LBS | SYSTOLIC BLOOD PRESSURE: 128 MMHG | TEMPERATURE: 98.1 F | DIASTOLIC BLOOD PRESSURE: 76 MMHG | BODY MASS INDEX: 26.59 KG/M2 | HEIGHT: 67 IN

## 2024-09-25 DIAGNOSIS — Z87.891 HISTORY OF NICOTINE DEPENDENCE: ICD-10-CM

## 2024-09-25 DIAGNOSIS — R79.81 ELEVATED CO2 LEVEL: ICD-10-CM

## 2024-09-25 DIAGNOSIS — R06.83 SNORING: Primary | ICD-10-CM

## 2024-09-25 DIAGNOSIS — N64.4 BREAST PAIN, LEFT: ICD-10-CM

## 2024-09-25 DIAGNOSIS — N39.0 RECURRENT UTI: ICD-10-CM

## 2024-09-25 LAB
25(OH)D3+25(OH)D2 SERPL-MCNC: 48.5 NG/ML (ref 30–100)
ALBUMIN SERPL-MCNC: 4.3 G/DL (ref 3.5–5.2)
ALBUMIN/GLOB SERPL: 2.3 G/DL
ALP SERPL-CCNC: 82 U/L (ref 39–117)
ALT SERPL-CCNC: 15 U/L (ref 1–33)
AST SERPL-CCNC: 22 U/L (ref 1–32)
BASOPHILS # BLD AUTO: 0.02 10*3/MM3 (ref 0–0.2)
BASOPHILS NFR BLD AUTO: 0.5 % (ref 0–1.5)
BILIRUB SERPL-MCNC: 0.7 MG/DL (ref 0–1.2)
BUN SERPL-MCNC: 11 MG/DL (ref 8–23)
BUN/CREAT SERPL: 14.7 (ref 7–25)
CALCIUM SERPL-MCNC: 9.9 MG/DL (ref 8.6–10.5)
CHLORIDE SERPL-SCNC: 102 MMOL/L (ref 98–107)
CHOLEST SERPL-MCNC: 212 MG/DL (ref 0–200)
CHOLEST/HDLC SERPL: 3.48 {RATIO}
CO2 SERPL-SCNC: 32 MMOL/L (ref 22–29)
CREAT SERPL-MCNC: 0.75 MG/DL (ref 0.57–1)
EGFRCR SERPLBLD CKD-EPI 2021: 89 ML/MIN/1.73
EOSINOPHIL # BLD AUTO: 0.13 10*3/MM3 (ref 0–0.4)
EOSINOPHIL NFR BLD AUTO: 2.9 % (ref 0.3–6.2)
ERYTHROCYTE [DISTWIDTH] IN BLOOD BY AUTOMATED COUNT: 12.3 % (ref 12.3–15.4)
GLOBULIN SER CALC-MCNC: 1.9 GM/DL
GLUCOSE SERPL-MCNC: 86 MG/DL (ref 65–99)
HBA1C MFR BLD: 5.2 % (ref 4.8–5.6)
HCT VFR BLD AUTO: 41.4 % (ref 34–46.6)
HCV IGG SERPL QL IA: NON REACTIVE
HDLC SERPL-MCNC: 61 MG/DL (ref 40–60)
HGB BLD-MCNC: 13.3 G/DL (ref 12–15.9)
IMM GRANULOCYTES # BLD AUTO: 0.01 10*3/MM3 (ref 0–0.05)
IMM GRANULOCYTES NFR BLD AUTO: 0.2 % (ref 0–0.5)
LDLC SERPL CALC-MCNC: 139 MG/DL (ref 0–100)
LYMPHOCYTES # BLD AUTO: 1.04 10*3/MM3 (ref 0.7–3.1)
LYMPHOCYTES NFR BLD AUTO: 23.4 % (ref 19.6–45.3)
MCH RBC QN AUTO: 28.5 PG (ref 26.6–33)
MCHC RBC AUTO-ENTMCNC: 32.1 G/DL (ref 31.5–35.7)
MCV RBC AUTO: 88.8 FL (ref 79–97)
MONOCYTES # BLD AUTO: 0.44 10*3/MM3 (ref 0.1–0.9)
MONOCYTES NFR BLD AUTO: 9.9 % (ref 5–12)
NEUTROPHILS # BLD AUTO: 2.8 10*3/MM3 (ref 1.7–7)
NEUTROPHILS NFR BLD AUTO: 63.1 % (ref 42.7–76)
NRBC BLD AUTO-RTO: 0 /100 WBC (ref 0–0.2)
PLATELET # BLD AUTO: 221 10*3/MM3 (ref 140–450)
POTASSIUM SERPL-SCNC: 4.7 MMOL/L (ref 3.5–5.2)
PROT SERPL-MCNC: 6.2 G/DL (ref 6–8.5)
RBC # BLD AUTO: 4.66 10*6/MM3 (ref 3.77–5.28)
SODIUM SERPL-SCNC: 139 MMOL/L (ref 136–145)
T4 FREE SERPL-MCNC: 1.2 NG/DL (ref 0.92–1.68)
TRIGL SERPL-MCNC: 65 MG/DL (ref 0–150)
TSH SERPL DL<=0.005 MIU/L-ACNC: 2.68 UIU/ML (ref 0.27–4.2)
UNABLE TO VOID: NORMAL
VLDLC SERPL CALC-MCNC: 12 MG/DL (ref 5–40)
WBC # BLD AUTO: 4.44 10*3/MM3 (ref 3.4–10.8)

## 2024-09-25 PROCEDURE — 99214 OFFICE O/P EST MOD 30 MIN: CPT | Performed by: NURSE PRACTITIONER

## 2024-09-25 RX ORDER — PERFLUOROHEXYLOCTANE 1 MG/MG
SOLUTION OPHTHALMIC
COMMUNITY
Start: 2024-09-10

## 2024-09-25 RX ORDER — NITROFURANTOIN MONOHYDRATE/MACROCRYSTALLINE 25; 75 MG/1; MG/1
CAPSULE ORAL
COMMUNITY
Start: 2024-09-16

## 2024-09-26 LAB
APPEARANCE UR: CLEAR
BACTERIA #/AREA URNS HPF: NORMAL /[HPF]
BILIRUB UR QL STRIP: NEGATIVE
CASTS URNS QL MICRO: NORMAL /LPF
COLOR UR: YELLOW
EPI CELLS #/AREA URNS HPF: NORMAL /HPF (ref 0–10)
GLUCOSE UR QL STRIP: NEGATIVE
HGB UR QL STRIP: NEGATIVE
KETONES UR QL STRIP: NEGATIVE
LEUKOCYTE ESTERASE UR QL STRIP: NEGATIVE
MICRO URNS: NORMAL
MICRO URNS: NORMAL
NITRITE UR QL STRIP: NEGATIVE
PH UR STRIP: 7.5 [PH] (ref 5–7.5)
PROT UR QL STRIP: NEGATIVE
RBC #/AREA URNS HPF: NORMAL /HPF (ref 0–2)
SP GR UR STRIP: 1.01 (ref 1–1.03)
URINALYSIS REFLEX: NORMAL
UROBILINOGEN UR STRIP-MCNC: 0.2 MG/DL (ref 0.2–1)
WBC #/AREA URNS HPF: NORMAL /HPF (ref 0–5)

## 2024-10-04 RX ORDER — ATORVASTATIN CALCIUM 40 MG/1
40 TABLET, FILM COATED ORAL DAILY
Qty: 90 TABLET | Refills: 0 | Status: SHIPPED | OUTPATIENT
Start: 2024-10-04

## 2024-10-07 ENCOUNTER — HOSPITAL ENCOUNTER (OUTPATIENT)
Dept: RESPIRATORY THERAPY | Facility: HOSPITAL | Age: 64
Discharge: HOME | End: 2024-10-07
Admitting: NURSE PRACTITIONER
Payer: COMMERCIAL

## 2024-10-07 DIAGNOSIS — Z87.891 HISTORY OF NICOTINE DEPENDENCE: ICD-10-CM

## 2024-10-07 DIAGNOSIS — R79.81 ELEVATED CO2 LEVEL: ICD-10-CM

## 2024-10-07 PROCEDURE — 94729 DIFFUSING CAPACITY: CPT

## 2024-10-07 PROCEDURE — 94726 PLETHYSMOGRAPHY LUNG VOLUMES: CPT

## 2024-10-07 PROCEDURE — 94010 BREATHING CAPACITY TEST: CPT

## 2024-10-22 DIAGNOSIS — Z12.31 ENCOUNTER FOR SCREENING MAMMOGRAM FOR MALIGNANT NEOPLASM OF BREAST: Primary | ICD-10-CM

## 2024-10-29 NOTE — TELEPHONE ENCOUNTER
I called pt to discuss overdue lab orders for lipids, but had to leave a vm.  I mailed pt a letter with the order as a reminder.   
c RW/fair balance

## 2024-10-31 ENCOUNTER — HOSPITAL ENCOUNTER (OUTPATIENT)
Facility: SURGERY CENTER | Age: 64
Setting detail: HOSPITAL OUTPATIENT SURGERY
Discharge: HOME | End: 2024-10-31
Attending: INTERNAL MEDICINE | Admitting: INTERNAL MEDICINE
Payer: COMMERCIAL

## 2024-10-31 ENCOUNTER — ANESTHESIA (OUTPATIENT)
Dept: SURGERY | Facility: SURGERY CENTER | Age: 64
End: 2024-10-31
Payer: COMMERCIAL

## 2024-10-31 ENCOUNTER — ANESTHESIA EVENT (OUTPATIENT)
Dept: SURGERY | Facility: SURGERY CENTER | Age: 64
End: 2024-10-31
Payer: COMMERCIAL

## 2024-10-31 VITALS
TEMPERATURE: 98 F | HEIGHT: 67 IN | DIASTOLIC BLOOD PRESSURE: 85 MMHG | BODY MASS INDEX: 26.74 KG/M2 | SYSTOLIC BLOOD PRESSURE: 127 MMHG | HEART RATE: 71 BPM | WEIGHT: 170.4 LBS | OXYGEN SATURATION: 99 % | RESPIRATION RATE: 16 BRPM

## 2024-10-31 DIAGNOSIS — Z80.0 FAMILY HISTORY OF MALIGNANT NEOPLASM OF COLON: ICD-10-CM

## 2024-10-31 DIAGNOSIS — Z12.11 ENCOUNTER FOR SCREENING FOR MALIGNANT NEOPLASM OF COLON: ICD-10-CM

## 2024-10-31 PROCEDURE — 45381 COLONOSCOPY SUBMUCOUS NJX: CPT | Performed by: INTERNAL MEDICINE

## 2024-10-31 PROCEDURE — 25010000002 PROPOFOL 10 MG/ML EMULSION: Performed by: NURSE ANESTHETIST, CERTIFIED REGISTERED

## 2024-10-31 PROCEDURE — 88305 TISSUE EXAM BY PATHOLOGIST: CPT | Performed by: INTERNAL MEDICINE

## 2024-10-31 PROCEDURE — 45385 COLONOSCOPY W/LESION REMOVAL: CPT | Performed by: INTERNAL MEDICINE

## 2024-10-31 PROCEDURE — 25810000003 LACTATED RINGERS PER 1000 ML: Performed by: INTERNAL MEDICINE

## 2024-10-31 PROCEDURE — S0260 H&P FOR SURGERY: HCPCS | Performed by: INTERNAL MEDICINE

## 2024-10-31 PROCEDURE — 25010000002 LIDOCAINE 2% SOLUTION: Performed by: NURSE ANESTHETIST, CERTIFIED REGISTERED

## 2024-10-31 DEVICE — REPLAY HEMOSTASIS CLIP, 16MM SPAN
Type: IMPLANTABLE DEVICE | Site: COLON | Status: FUNCTIONAL
Brand: REPLAY

## 2024-10-31 RX ORDER — LIDOCAINE HYDROCHLORIDE 10 MG/ML
0.5 INJECTION, SOLUTION INFILTRATION; PERINEURAL ONCE AS NEEDED
Status: DISCONTINUED | OUTPATIENT
Start: 2024-10-31 | End: 2024-10-31 | Stop reason: HOSPADM

## 2024-10-31 RX ORDER — SODIUM CHLORIDE, SODIUM LACTATE, POTASSIUM CHLORIDE, CALCIUM CHLORIDE 600; 310; 30; 20 MG/100ML; MG/100ML; MG/100ML; MG/100ML
30 INJECTION, SOLUTION INTRAVENOUS CONTINUOUS
Status: ACTIVE | OUTPATIENT
Start: 2024-10-31 | End: 2024-10-31

## 2024-10-31 RX ORDER — SODIUM CHLORIDE 0.9 % (FLUSH) 0.9 %
10 SYRINGE (ML) INJECTION AS NEEDED
Status: DISCONTINUED | OUTPATIENT
Start: 2024-10-31 | End: 2024-10-31 | Stop reason: HOSPADM

## 2024-10-31 RX ORDER — LIDOCAINE HYDROCHLORIDE 20 MG/ML
INJECTION, SOLUTION INFILTRATION; PERINEURAL AS NEEDED
Status: DISCONTINUED | OUTPATIENT
Start: 2024-10-31 | End: 2024-10-31 | Stop reason: SURG

## 2024-10-31 RX ORDER — PROPOFOL 10 MG/ML
VIAL (ML) INTRAVENOUS AS NEEDED
Status: DISCONTINUED | OUTPATIENT
Start: 2024-10-31 | End: 2024-10-31 | Stop reason: SURG

## 2024-10-31 RX ADMIN — SODIUM CHLORIDE, SODIUM LACTATE, POTASSIUM CHLORIDE, AND CALCIUM CHLORIDE 30 ML/HR: .6; .31; .03; .02 INJECTION, SOLUTION INTRAVENOUS at 07:59

## 2024-10-31 RX ADMIN — PROPOFOL 160 MCG/KG/MIN: 10 INJECTION, EMULSION INTRAVENOUS at 09:03

## 2024-10-31 RX ADMIN — LIDOCAINE HYDROCHLORIDE 60 MG: 20 INJECTION, SOLUTION INFILTRATION; PERINEURAL at 09:02

## 2024-10-31 RX ADMIN — PROPOFOL 80 MG: 10 INJECTION, EMULSION INTRAVENOUS at 09:02

## 2024-10-31 RX ADMIN — PROPOFOL 100 MCG/KG/MIN: 10 INJECTION, EMULSION INTRAVENOUS at 09:13

## 2024-10-31 NOTE — ANESTHESIA POSTPROCEDURE EVALUATION
"Patient: Sarah Borges    Procedure Summary       Date: 10/31/24 Room / Location: [facility] / [facility]    Anesthesia Start: 0900 Anesthesia Stop: 0931    Procedure: COLONOSCOPY TO CECUM WITH HOT SNARE POLYPECTOMIES Diagnosis:       Encounter for screening for malignant neoplasm of colon      Family history of malignant neoplasm of colon      (Encounter for screening for malignant neoplasm of colon [Z12.11])      (Family history of malignant neoplasm of colon [Z80.0])    Surgeons: Tonya Lopez MD Provider: Prasanna Billingsley MD    Anesthesia Type: MAC ASA Status: 2            Anesthesia Type: MAC    Vitals  Vitals Value Taken Time   /80 10/31/24 0940   Temp 36.7 °C (98 °F) 10/31/24 0930   Pulse 70 10/31/24 0940   Resp 16 10/31/24 0940   SpO2 99 % 10/31/24 0940           Post Anesthesia Care and Evaluation    Patient location during evaluation: PACU  Level of consciousness: awake  Pain management: adequate    Airway patency: patent  Anesthetic complications: No anesthetic complications  PONV Status: controlled  Cardiovascular status: acceptable  Respiratory status: acceptable  Hydration status: acceptable    Comments: /80   Pulse 70   Temp 36.7 °C (98 °F) (Infrared)   Resp 16   Ht 170.2 cm (67\")   Wt 77.3 kg (170 lb 6.4 oz)   SpO2 99%   BMI 26.69 kg/m²       "

## 2024-10-31 NOTE — H&P
[facility]  Pre Procedure History & Physical    Chief Complaint:   screening    Subjective     HPI:   65 yo here today for colonoscopy.  Pt reports + FH CRC (MGM).  Patient denies GI symptoms currently.  Last exam > 10 yrs ago - normal per pt    Past Medical History:   Past Medical History:   Diagnosis Date    Agatston coronary artery calcium score less than 100     score was zero, 2/2016    Cataract     Surgery Nov & Dec 2023    Cervical neuritis     C4-C5 of the left    Chronic venous insufficiency     Headache unknown    occasionally for many years    Hyperlipidemia     Lateral epicondylitis of right elbow     Lumbar radiculopathy     Neck pain     Scoliosis unknown    surgery February, 2019    Visual impairment 3 or 4 weeks ago    blurred vision       Past Surgical History:  Past Surgical History:   Procedure Laterality Date    HYSTERECTOMY      NECK SURGERY      SPINE SURGERY  2019    2 rods and 22 screws placed in spine    TONSILLECTOMY  1972    TUBAL ABDOMINAL LIGATION  1993    VEIN LIGATION AND STRIPPING         Family History:  Family History   Problem Relation Age of Onset    Hypertension Mother     Heart attack Mother         At an advanced age.    Hyperlipidemia Mother     Heart disease Mother         passed from heart attack    Alcohol abuse Father     Migraines Sister     Heart attack Sister         At a young age.    Cancer Maternal Grandmother         colon    Breast cancer Maternal Aunt        Social History:   reports that she has quit smoking. Her smoking use included cigarettes. She has never used smokeless tobacco. She reports current alcohol use. She reports that she does not use drugs.    Medications:   Medications Prior to Admission   Medication Sig Dispense Refill Last Dose/Taking    atorvastatin (LIPITOR) 40 MG tablet TAKE 1 TABLET BY MOUTH EVERY DAY 90 tablet 0 Past Week    Calcium Carbonate-Vit D-Min (CALCIUM 1200 PO)    Past Week    Docusate Sodium 100 MG capsule  "Take 1 tablet by mouth 2 (Two) Times a Day.   10/30/2024    ibuprofen (ADVIL,MOTRIN) 200 MG tablet Take 1 tablet by mouth Every 6 (Six) Hours As Needed for Mild Pain.   Past Week    Magnesium 400 MG capsule Daily.   Past Week    Miebo 1.338 GM/ML solution INSTILL 1 DROP INTO AFFECTED EYE(S) FOUR TIMES DAILY   10/30/2024    polyethylene glycol (MIRALAX) 17 g packet Take 17 g by mouth Daily.   10/31/2024 Morning    Repatha solution prefilled syringe injection INJECT 1 ML UNDER THE SKIN INTO THE APPROPRIATE AREA AS DIRECTED EVERY 30 (THIRTY) DAYS. 3 each 1 Past Month    Tirzepatide (Mounjaro) 7.5 MG/0.5ML solution pen-injector 0.5 mL 1 (One) Time Per Week.   10/23/2024       Allergies:  Patient has no known allergies.    ROS:    Pertinent items are noted in HPI     Objective     Blood pressure 119/59, pulse 75, temperature 97.7 °F (36.5 °C), temperature source Temporal, resp. rate 16, height 170.2 cm (67\"), weight 77.3 kg (170 lb 6.4 oz), SpO2 99%.    Physical Exam   Constitutional: Pt is oriented to person, place, and time and well-developed, well-nourished, and in no distress.   Mouth/Throat: Oropharynx is clear and moist.   Neck: Normal range of motion.   Cardiovascular: Normal rate, regular rhythm    Pulmonary/Chest: Effort normal    Abdominal: Soft. Nontender  Skin: Skin is warm and dry.   Psychiatric: Mood, memory, affect and judgment normal.     Assessment & Plan     Diagnosis:  Screening for colon cancer    Anticipated Surgical Procedure:  colonoscopy    The risks, benefits, and alternatives of this procedure have been discussed with the patient or the responsible party- the patient understands and agrees to proceed.                                                              "

## 2024-10-31 NOTE — DISCHARGE INSTRUCTIONS
POST CLIP INSTRUCTIONS    The post polypectomy clip is a new device that has been developed to stop or prevent bleeding during GI endoscopic procedures. The device consists of a small metal clip that is attached to the end of an endoscope. The clip is designed to be deployed at the site of bleeding or site where possible bleeding could occur.  It compresses the blood vessel and stops the bleeding.      _______ clip(s) was placed in your body on ______________________ to control bleeding in your GI tract.  If scheduled for an MRI, please inform the technologist you should have an X-ray prior to your MRI to confirm the metal clip has passed.

## 2024-10-31 NOTE — ANESTHESIA PREPROCEDURE EVALUATION
Anesthesia Evaluation     Patient summary reviewed and Nursing notes reviewed                Airway   Mallampati: II  TM distance: >3 FB  Neck ROM: full  No difficulty expected  Dental - normal exam     Pulmonary - negative pulmonary ROS and normal exam   Cardiovascular - normal exam  Exercise tolerance: good (4-7 METS)    (+) hyperlipidemia      Neuro/Psych  (+) headaches  GI/Hepatic/Renal/Endo - negative ROS     Musculoskeletal     Abdominal    Substance History - negative use     OB/GYN negative ob/gyn ROS         Other   arthritis,                 Anesthesia Plan    ASA 2     MAC     intravenous induction     Anesthetic plan, risks, benefits, and alternatives have been provided, discussed and informed consent has been obtained with: patient.    CODE STATUS:

## 2024-11-01 LAB
CYTO UR: NORMAL
LAB AP CASE REPORT: NORMAL
LAB AP CLINICAL INFORMATION: NORMAL
PATH REPORT.FINAL DX SPEC: NORMAL
PATH REPORT.GROSS SPEC: NORMAL

## 2024-11-08 ENCOUNTER — TELEPHONE (OUTPATIENT)
Dept: GASTROENTEROLOGY | Facility: CLINIC | Age: 64
End: 2024-11-08
Payer: COMMERCIAL

## 2024-11-08 NOTE — TELEPHONE ENCOUNTER
----- Message from Tonya Lopez sent at 11/8/2024  9:40 AM EST -----  The polyp(s) biopsies showed adenomatous change. This is not cancerous but is considered potentially precancerous. Follow-up colonoscopy in 3 years is advised.

## 2024-11-08 NOTE — TELEPHONE ENCOUNTER
Patient notified of results and recommendations and verbalized understanding    Hm and cs recall placed for 10/31/27

## 2024-12-18 ENCOUNTER — HOSPITAL ENCOUNTER (OUTPATIENT)
Dept: MAMMOGRAPHY | Facility: HOSPITAL | Age: 64
Discharge: HOME | End: 2024-12-18
Payer: COMMERCIAL

## 2024-12-18 DIAGNOSIS — Z12.31 ENCOUNTER FOR SCREENING MAMMOGRAM FOR MALIGNANT NEOPLASM OF BREAST: ICD-10-CM

## 2024-12-18 PROCEDURE — 77063 BREAST TOMOSYNTHESIS BI: CPT

## 2024-12-18 PROCEDURE — 77067 SCR MAMMO BI INCL CAD: CPT

## 2025-01-08 RX ORDER — ATORVASTATIN CALCIUM 40 MG/1
40 TABLET, FILM COATED ORAL DAILY
Qty: 90 TABLET | Refills: 0 | Status: SHIPPED | OUTPATIENT
Start: 2025-01-08

## 2025-03-28 RX ORDER — ATORVASTATIN CALCIUM 40 MG/1
40 TABLET, FILM COATED ORAL DAILY
Qty: 90 TABLET | Refills: 1 | Status: SHIPPED | OUTPATIENT
Start: 2025-03-28

## 2025-03-28 RX ORDER — EVOLOCUMAB 140 MG/ML
INJECTION, SOLUTION SUBCUTANEOUS
Qty: 2 EACH | Refills: 6 | Status: SHIPPED | OUTPATIENT
Start: 2025-03-28

## 2025-03-31 NOTE — PROGRESS NOTES
"    I N T E R N A L  M E D I C I N E  Sandy Pelletier, APRN    ENCOUNTER DATE:  04/01/2025    Sarah Borges / 64 y.o. / female      CHIEF COMPLAINT / REASON FOR OFFICE VISIT     Weight Check      ASSESSMENT & PLAN     Diagnoses and all orders for this visit:    1. Overweight with body mass index (BMI) of 28 to 28.9 in adult (Primary)         SUMMARY/DISCUSSION  Reviewed risks, benefits, side effects of GLP-1 agents with patient.  She will check with insurance on coverage of either Wegovy or Zepbound.  If not covered by insurance, discussed cash pay options through directly through .  Discussed risks of compounded medications.  Encouraged healthy diet, regular exercise.      Next Appointment with me: 9/23/2025    Return for Next scheduled follow up.      VITAL SIGNS     Visit Vitals  /74   Pulse 53   Temp 97.2 °F (36.2 °C)   Ht 170.2 cm (67\")   Wt 82.1 kg (181 lb)   SpO2 98%   BMI 28.35 kg/m²             Wt Readings from Last 3 Encounters:   04/01/25 82.1 kg (181 lb)   10/31/24 77.3 kg (170 lb 6.4 oz)   09/25/24 76.8 kg (169 lb 6.4 oz)     Body mass index is 28.35 kg/m².        MEDICATIONS AT THE TIME OF OFFICE VISIT     Current Outpatient Medications on File Prior to Visit   Medication Sig Dispense Refill    atorvastatin (LIPITOR) 40 MG tablet TAKE 1 TABLET BY MOUTH EVERY DAY 90 tablet 1    Calcium Carbonate-Vit D-Min (CALCIUM 1200 PO)       Docusate Sodium 100 MG capsule Take 1 tablet by mouth 2 (Two) Times a Day.      Evolocumab (Repatha) solution prefilled syringe injection INJECT 1 ML UNDER THE SKIN INTO THE APPROPRIATE AREA AS DIRECTED EVERY 30 (THIRTY) DAYS. 2 each 6    Magnesium 400 MG capsule Daily.      Miebo 1.338 GM/ML solution INSTILL 1 DROP INTO AFFECTED EYE(S) FOUR TIMES DAILY      polyethylene glycol (MIRALAX) 17 g packet Take 17 g by mouth Daily.       No current facility-administered medications on file prior to visit.        HISTORY OF PRESENT ILLNESS     Formerly on Mounjaro, " obtained through online [facility] pharmacy.  As of September 2024, BMI 26.  Stopped taking several months ago.  BMI now 28.  She is interested in resuming GLP-1 use.  Has chronic back pain with prior spinal surgery.  Chronic pain limits exercise but she is working towards goal of obtaining 150 minutes per week.  Following low calorie diet.  No personal/ family history of pancreatitis/ thyroid cancer.        Patient Care Team:  Sandy Pelletier APRN as PCP - General (Family Medicine)  Roni Araujo MD (Inactive) as PCP - Family Medicine    REVIEW OF SYSTEMS     Review of Systems   Constitutional:  Negative for chills, fever and unexpected weight change.   Respiratory:  Negative for cough, chest tightness and shortness of breath.    Cardiovascular:  Negative for chest pain, palpitations and leg swelling.   Musculoskeletal:  Positive for back pain (Chronic).   Neurological:  Negative for dizziness, weakness, light-headedness and headaches.   Psychiatric/Behavioral:  The patient is not nervous/anxious.           PHYSICAL EXAMINATION     Physical Exam  Vitals reviewed.   Constitutional:       General: She is not in acute distress.     Appearance: Normal appearance. She is not ill-appearing, toxic-appearing or diaphoretic.   HENT:      Head: Normocephalic and atraumatic.   Cardiovascular:      Rate and Rhythm: Normal rate and regular rhythm.      Heart sounds: Normal heart sounds.   Pulmonary:      Effort: Pulmonary effort is normal.      Breath sounds: Normal breath sounds.   Neurological:      Mental Status: She is alert and oriented to person, place, and time. Mental status is at baseline.   Psychiatric:         Mood and Affect: Mood normal.         Behavior: Behavior normal.         Thought Content: Thought content normal.         Judgment: Judgment normal.           REVIEWED DATA     Labs:           Imaging:            Medical Tests:           Summary of old records / correspondence / consultant report:            Request outside records:

## 2025-04-01 ENCOUNTER — OFFICE VISIT (OUTPATIENT)
Dept: INTERNAL MEDICINE | Age: 65
End: 2025-04-01
Payer: COMMERCIAL

## 2025-04-01 VITALS
SYSTOLIC BLOOD PRESSURE: 118 MMHG | TEMPERATURE: 97.2 F | WEIGHT: 181 LBS | HEIGHT: 67 IN | BODY MASS INDEX: 28.41 KG/M2 | DIASTOLIC BLOOD PRESSURE: 74 MMHG | OXYGEN SATURATION: 98 % | HEART RATE: 53 BPM

## 2025-04-01 DIAGNOSIS — E66.3 OVERWEIGHT WITH BODY MASS INDEX (BMI) OF 28 TO 28.9 IN ADULT: Primary | ICD-10-CM

## 2025-04-01 PROCEDURE — 99213 OFFICE O/P EST LOW 20 MIN: CPT

## 2025-04-02 ENCOUNTER — PRIOR AUTHORIZATION (OUTPATIENT)
Dept: INTERNAL MEDICINE | Age: 65
End: 2025-04-02
Payer: COMMERCIAL

## 2025-04-02 DIAGNOSIS — E66.3 OVERWEIGHT WITH BODY MASS INDEX (BMI) OF 28 TO 28.9 IN ADULT: Primary | ICD-10-CM

## 2025-04-02 RX ORDER — TIRZEPATIDE 2.5 MG/.5ML
2.5 INJECTION, SOLUTION SUBCUTANEOUS WEEKLY
Qty: 2 ML | Refills: 0 | Status: SHIPPED | OUTPATIENT
Start: 2025-04-02 | End: 2025-04-04 | Stop reason: SDUPTHER

## 2025-04-04 ENCOUNTER — TELEPHONE (OUTPATIENT)
Dept: INTERNAL MEDICINE | Age: 65
End: 2025-04-04
Payer: COMMERCIAL

## 2025-04-04 DIAGNOSIS — E66.3 OVERWEIGHT WITH BODY MASS INDEX (BMI) OF 28 TO 28.9 IN ADULT: ICD-10-CM

## 2025-04-04 RX ORDER — TIRZEPATIDE 2.5 MG/.5ML
2.5 INJECTION, SOLUTION SUBCUTANEOUS WEEKLY
Qty: 2 ML | Refills: 0 | Status: SHIPPED | OUTPATIENT
Start: 2025-04-04 | End: 2025-04-26

## 2025-04-04 NOTE — TELEPHONE ENCOUNTER
Patient has requested that prescription for Zepbound be sent to the [facility] on [address], [address]

## 2025-05-06 DIAGNOSIS — E66.3 OVERWEIGHT WITH BODY MASS INDEX (BMI) OF 28 TO 28.9 IN ADULT: Primary | ICD-10-CM

## 2025-05-06 RX ORDER — TIRZEPATIDE 5 MG/.5ML
5 INJECTION, SOLUTION SUBCUTANEOUS WEEKLY
Qty: 2 ML | Refills: 0 | Status: SHIPPED | OUTPATIENT
Start: 2025-05-06 | End: 2025-05-28

## 2025-05-07 DIAGNOSIS — E66.3 OVERWEIGHT WITH BODY MASS INDEX (BMI) OF 28 TO 28.9 IN ADULT: ICD-10-CM

## 2025-05-07 RX ORDER — TIRZEPATIDE 5 MG/.5ML
5 INJECTION, SOLUTION SUBCUTANEOUS WEEKLY
Qty: 2 ML | Refills: 0 | OUTPATIENT
Start: 2025-05-07 | End: 2025-05-29

## 2025-05-13 ENCOUNTER — TELEMEDICINE (OUTPATIENT)
Dept: INTERNAL MEDICINE | Age: 65
End: 2025-05-13
Payer: COMMERCIAL

## 2025-05-13 ENCOUNTER — TELEPHONE (OUTPATIENT)
Dept: INTERNAL MEDICINE | Age: 65
End: 2025-05-13

## 2025-05-13 VITALS — WEIGHT: 172.9 LBS | HEIGHT: 67 IN | BODY MASS INDEX: 27.14 KG/M2

## 2025-05-13 DIAGNOSIS — E66.3 OVERWEIGHT WITH BODY MASS INDEX (BMI) OF 27 TO 27.9 IN ADULT: Primary | ICD-10-CM

## 2025-05-13 DIAGNOSIS — E78.01 FAMILIAL HYPERCHOLESTEROLEMIA: ICD-10-CM

## 2025-05-13 PROCEDURE — 99213 OFFICE O/P EST LOW 20 MIN: CPT

## 2025-05-13 RX ORDER — RIBOFLAVIN (VITAMIN B2) 100 MG
100 TABLET ORAL DAILY
COMMUNITY

## 2025-05-13 RX ORDER — ASCORBIC ACID 1000 MG
TABLET ORAL
COMMUNITY

## 2025-05-13 NOTE — PROGRESS NOTES
I N T E R N A L  M E D I C I N E  ANA MORALES      ENCOUNTER DATE:  05/13/2025    Sarah Borges / 64 y.o. / female        You have chosen to receive care through a telehealth visit.  Do you consent to use a video/audio connection for your medical care today? Yes    Location of patient is in [place] at work and location of provider is in [place] at clinic      CHIEF COMPLAINT / REASON FOR OFFICE VISIT     TELEHEALTH ENCOUNTER:  Weight Loss      ASSESSMENT & PLAN     Diagnoses and all orders for this visit:    1. Overweight with body mass index (BMI) of 27 to 27.9 in adult (Primary)  -     Hemoglobin A1c; Future    2. Familial hypercholesterolemia  -     Comprehensive Metabolic Panel; Future  -     Lipid Panel With / Chol / HDL Ratio; Future          SUMMARY/DISCUSSION  Tolerating Zepbound well without side effects.  Continue Zepbound 5 mg weekly, healthy diet, regular exercise as tolerated.  Monitor for any signs/ symptoms of hypoglycemia.  Update A1C.   Update fasting lipid panel.      Time spent: 10 minutes    Next Appointment with me: 9/23/2025    Return for Recheck in late June 2025 (may be telehealth).        HISTORY OF PRESENT ILLNESS     1 month follow up after starting Zepbound 2.5 mg weekly x 4 weeks for BMI 28, weight 181 pounds.  Has now completed 4 doses of 2.5 mg weekly and one dose of 5 mg weekly.  BMI now 27, weight 172 pounds - weight loss of 9 pounds in 5 weeks.  Minimal nausea, and otherwise denies any side effects.  Eating healthy diet.  Exercise limited by chronic back pain but is trying to walk outside 3x weekly, 30 minutes at one time.  September 2024 A1C 5.2.  She denies any signs/ symptoms of hypoglycemia.     HLD: September 2024 lipid panel with elevated ; normal triglycerides 65.    REVIEWED DATA     Labs:           Imaging:           Medical Tests:           Summary of old records / correspondence / consultant report:           Request outside records:         Template  created by Zan Delaney MD

## 2025-05-29 DIAGNOSIS — E66.3 OVERWEIGHT WITH BODY MASS INDEX (BMI) OF 28 TO 28.9 IN ADULT: ICD-10-CM

## 2025-05-29 RX ORDER — TIRZEPATIDE 5 MG/.5ML
5 INJECTION, SOLUTION SUBCUTANEOUS WEEKLY
Qty: 6 ML | Refills: 0 | Status: SHIPPED | OUTPATIENT
Start: 2025-05-29

## 2025-06-23 ENCOUNTER — TELEMEDICINE (OUTPATIENT)
Dept: INTERNAL MEDICINE | Age: 65
End: 2025-06-23
Payer: COMMERCIAL

## 2025-06-23 VITALS — BODY MASS INDEX: 26.84 KG/M2 | WEIGHT: 171 LBS | HEIGHT: 67 IN

## 2025-06-23 DIAGNOSIS — E66.3 OVERWEIGHT WITH BODY MASS INDEX (BMI) OF 26 TO 26.9 IN ADULT: ICD-10-CM

## 2025-06-23 PROCEDURE — 99213 OFFICE O/P EST LOW 20 MIN: CPT

## 2025-06-23 RX ORDER — TIRZEPATIDE 5 MG/.5ML
5 INJECTION, SOLUTION SUBCUTANEOUS WEEKLY
Qty: 6 ML | Refills: 0 | Status: SHIPPED | OUTPATIENT
Start: 2025-06-23

## 2025-06-23 NOTE — PROGRESS NOTES
I N T E R N A L  M E D I C I N E  RENATA WATSON, ANA      ENCOUNTER DATE:  06/23/2025    Sarah Borges / 64 y.o. / female        You have chosen to receive care through a telehealth visit.  Do you consent to use a video/audio connection for your medical care today? Yes      CHIEF COMPLAINT / REASON FOR OFFICE VISIT     TELEHEALTH ENCOUNTER:  Weight Loss      ASSESSMENT & PLAN     Diagnoses and all orders for this visit:    1. Overweight with body mass index (BMI) of 26 to 26.9 in adult  -     Tirzepatide-Weight Management (Zepbound) 5 MG/0.5ML solution; Inject 0.5 mL under the skin into the appropriate area as directed 1 (One) Time Per Week.  Dispense: 6 mL; Refill: 0          SUMMARY/DISCUSSION  Tolerating Zepbound 5 mg weekly well with improving BMI.  Continue same.  She will reach out if insurance problems.  May need to consider changing to Wegovy.  Update fasting labs, including A1C/ lipid panel next week.  Follow up as scheduled in September 2025.        Time spent: 10 minutes    Next Appointment with me: 9/23/2025    Return for Next scheduled follow up.        HISTORY OF PRESENT ILLNESS     Last seen May 13, 2025.    BMI 26: Tolerating weight loss of 7.5 since start Zepbound.  Has now completed 7 doses of Zepbound 5 mg weekly and tolerating well without side effects.  Still following healthy diet, and walking few times weekly.    May 2025 labs outstanding.  She plans to complete fasting labs next Monday or Tuesday.          REVIEWED DATA     Labs:           Imaging:           Medical Tests:           Summary of old records / correspondence / consultant report:           Request outside records:         Template created by Zan Delaney MD

## 2025-06-30 ENCOUNTER — LAB (OUTPATIENT)
Dept: LAB | Facility: HOSPITAL | Age: 65
End: 2025-06-30
Payer: COMMERCIAL

## 2025-06-30 ENCOUNTER — LAB (OUTPATIENT)
Facility: HOSPITAL | Age: 65
End: 2025-06-30
Payer: COMMERCIAL

## 2025-06-30 PROCEDURE — 80053 COMPREHEN METABOLIC PANEL: CPT

## 2025-06-30 PROCEDURE — 80061 LIPID PANEL: CPT

## 2025-06-30 PROCEDURE — 83036 HEMOGLOBIN GLYCOSYLATED A1C: CPT

## 2025-07-10 DIAGNOSIS — E66.3 OVERWEIGHT WITH BODY MASS INDEX (BMI) OF 26 TO 26.9 IN ADULT: ICD-10-CM

## 2025-07-11 RX ORDER — TIRZEPATIDE 5 MG/.5ML
5 INJECTION, SOLUTION SUBCUTANEOUS WEEKLY
Qty: 6 ML | Refills: 0 | Status: SHIPPED | OUTPATIENT
Start: 2025-07-11

## 2025-07-14 ENCOUNTER — PRIOR AUTHORIZATION (OUTPATIENT)
Dept: INTERNAL MEDICINE | Facility: CLINIC | Age: 65
End: 2025-07-14
Payer: COMMERCIAL

## 2025-07-14 NOTE — TELEPHONE ENCOUNTER
Sarah Borges (Key: [id number])  Rx #: [id number]  Zepbound 5MG/0.5ML pen-injectors    [facility]       PENDING

## 2025-07-16 ENCOUNTER — TELEPHONE (OUTPATIENT)
Dept: INTERNAL MEDICINE | Age: 65
End: 2025-07-16
Payer: COMMERCIAL

## 2025-07-16 DIAGNOSIS — E66.3 OVERWEIGHT WITH BODY MASS INDEX (BMI) OF 26 TO 26.9 IN ADULT: Primary | ICD-10-CM

## 2025-07-16 RX ORDER — SEMAGLUTIDE 0.25 MG/.5ML
0.25 INJECTION, SOLUTION SUBCUTANEOUS WEEKLY
Qty: 2 ML | Refills: 0 | Status: SHIPPED | OUTPATIENT
Start: 2025-07-16 | End: 2025-08-07

## 2025-07-16 NOTE — TELEPHONE ENCOUNTER
Patient insurance is no longer covering the Zepound and wants to switch over to the wegovy she's leaving to go out of town. Please advise what you would like to me tell her.

## 2025-07-16 NOTE — TELEPHONE ENCOUNTER
Please stop Zepbound.  I sent RX for Wegovy 0.25 mg weekly x 4 doses to pharmacy.  If tolerating well, will plan to increase to 0.5 mg weekly as maintenance dose. Please let me know when able to  from pharmacy and if you experience any side effects.

## 2025-08-11 DIAGNOSIS — E66.3 OVERWEIGHT WITH BODY MASS INDEX (BMI) OF 26 TO 26.9 IN ADULT: Primary | ICD-10-CM

## 2025-08-11 RX ORDER — SEMAGLUTIDE 0.5 MG/.5ML
0.5 INJECTION, SOLUTION SUBCUTANEOUS WEEKLY
Qty: 2 ML | Refills: 1 | Status: SHIPPED | OUTPATIENT
Start: 2025-08-11

## 2025-08-25 ENCOUNTER — OFFICE VISIT (OUTPATIENT)
Dept: CARDIOLOGY | Age: 65
End: 2025-08-25
Payer: COMMERCIAL

## 2025-08-25 VITALS
DIASTOLIC BLOOD PRESSURE: 82 MMHG | WEIGHT: 176 LBS | OXYGEN SATURATION: 99 % | BODY MASS INDEX: 27.62 KG/M2 | SYSTOLIC BLOOD PRESSURE: 132 MMHG | HEIGHT: 67 IN | HEART RATE: 56 BPM

## 2025-08-25 DIAGNOSIS — Z82.49 FAMILY HISTORY OF PREMATURE CORONARY ARTERY DISEASE: ICD-10-CM

## 2025-08-25 DIAGNOSIS — E78.00 HYPERCHOLESTEROLEMIA: Primary | ICD-10-CM

## 2025-08-25 PROCEDURE — 93000 ELECTROCARDIOGRAM COMPLETE: CPT | Performed by: NURSE PRACTITIONER

## 2025-08-25 PROCEDURE — 99214 OFFICE O/P EST MOD 30 MIN: CPT | Performed by: NURSE PRACTITIONER

## 2025-08-25 RX ORDER — ATORVASTATIN CALCIUM 40 MG/1
40 TABLET, FILM COATED ORAL DAILY
Qty: 90 TABLET | Refills: 3 | Status: SHIPPED | OUTPATIENT
Start: 2025-08-25

## 2025-08-25 RX ORDER — B-COMPLEX WITH VITAMIN C
TABLET ORAL
COMMUNITY

## (undated) DEVICE — NDL SCLEROTHERAPY INTERJECT 25G 4 240CM

## (undated) DEVICE — CANN O2 ETCO2 FITS ALL CONN CO2 SMPL A/ 7IN DISP LF

## (undated) DEVICE — GOWN ISOL W/THUMB UNIV BLU BX/15

## (undated) DEVICE — ADAPT CLN SCPE ENDO PORPOISE BX/50 DISP

## (undated) DEVICE — GAUZE,SPONGE,FLUFF,6"X6.75",STRL,5/TRAY: Brand: MEDLINE

## (undated) DEVICE — THE SINGLE USE ETRAP – POLYP TRAP IS USED FOR SUCTION RETRIEVAL OF ENDOSCOPICALLY REMOVED POLYPS.: Brand: ETRAP

## (undated) DEVICE — SUREFIT, DUAL DISPERSIVE ELECTRODE, CONTACT QUALITY MONITOR: Brand: SUREFIT

## (undated) DEVICE — FLEX ADVANTAGE 1500CC: Brand: FLEX ADVANTAGE

## (undated) DEVICE — Device

## (undated) DEVICE — SINGLE-USE POLYPECTOMY SNARE: Brand: SENSATION SHORT THROW

## (undated) DEVICE — VIAL FORMLN CAP 10PCT 20ML

## (undated) DEVICE — SYRINGE, LUER SLIP, STERILE, 60ML: Brand: MEDLINE

## (undated) DEVICE — Device: Brand: SPOT EX ENDOSCOPIC TATTOO

## (undated) DEVICE — KT ORCA ORCAPOD DISP STRL